# Patient Record
Sex: FEMALE | Race: WHITE | NOT HISPANIC OR LATINO | Employment: FULL TIME | URBAN - METROPOLITAN AREA
[De-identification: names, ages, dates, MRNs, and addresses within clinical notes are randomized per-mention and may not be internally consistent; named-entity substitution may affect disease eponyms.]

---

## 2017-04-18 RX ORDER — METOPROLOL SUCCINATE 25 MG
TABLET, EXTENDED RELEASE 24 HR ORAL
Qty: 45 TABLET | Refills: 0 | Status: SHIPPED | OUTPATIENT
Start: 2017-04-18 | End: 2017-07-12 | Stop reason: SDUPTHER

## 2017-07-12 RX ORDER — METOPROLOL SUCCINATE 25 MG
TABLET, EXTENDED RELEASE 24 HR ORAL
Qty: 45 TABLET | Refills: 0 | Status: SHIPPED | OUTPATIENT
Start: 2017-07-12 | End: 2017-10-03 | Stop reason: SDUPTHER

## 2017-07-27 ENCOUNTER — OFFICE VISIT (OUTPATIENT)
Dept: RETAIL CLINIC | Facility: CLINIC | Age: 57
End: 2017-07-27

## 2017-07-27 VITALS — OXYGEN SATURATION: 98 % | HEART RATE: 66 BPM | TEMPERATURE: 98.1 F

## 2017-07-27 DIAGNOSIS — J20.8 ACUTE BRONCHITIS DUE TO OTHER SPECIFIED ORGANISMS: Primary | ICD-10-CM

## 2017-07-27 PROBLEM — J20.9 ACUTE BRONCHITIS: Status: ACTIVE | Noted: 2017-07-27

## 2017-07-27 PROCEDURE — 99213 OFFICE O/P EST LOW 20 MIN: CPT | Performed by: NURSE PRACTITIONER

## 2017-07-27 RX ORDER — FLUTICASONE PROPIONATE 110 UG/1
1 AEROSOL, METERED RESPIRATORY (INHALATION)
Qty: 12 G | Refills: 0 | Status: SHIPPED | OUTPATIENT
Start: 2017-07-27 | End: 2017-12-05

## 2017-07-27 RX ORDER — AZITHROMYCIN 250 MG/1
TABLET, FILM COATED ORAL
Qty: 6 TABLET | Refills: 0 | Status: SHIPPED | OUTPATIENT
Start: 2017-07-27 | End: 2017-12-05

## 2017-07-27 NOTE — PROGRESS NOTES
Subjective   Patient ID: Shira Cortes is a 57 y.o. female presents with   Chief Complaint   Patient presents with   • URI       HPI Comments: 56 yo wf  PMH: HCM S/P valve repair '07  Cc: congestion and prod cough x 3d. Seh denies fever, chills, but does have some assoc GI upset. She has tried tylenol and OTC cough prep for relief.    URI    Associated symptoms include congestion, coughing, rhinorrhea and a sore throat. Pertinent negatives include no abdominal pain, chest pain, diarrhea, ear pain, rash, vomiting or wheezing.       Allergies   Allergen Reactions   • Penicillins    • Sulfa Antibiotics        The following portions of the patient's history were reviewed and updated as appropriate: allergies, current medications, past family history, past medical history, past social history, past surgical history and problem list.      Review of Systems   Constitutional: Positive for fatigue. Negative for activity change and unexpected weight change.   HENT: Positive for congestion, postnasal drip, rhinorrhea, sinus pressure and sore throat. Negative for ear pain.         Hoarseness   Eyes: Negative for pain and discharge.   Respiratory: Positive for cough. Negative for chest tightness, shortness of breath and wheezing.    Cardiovascular: Negative for chest pain and palpitations.   Gastrointestinal: Negative for abdominal pain, diarrhea and vomiting.   Endocrine: Negative.    Genitourinary: Negative.    Musculoskeletal: Negative for joint swelling.   Skin: Negative for color change, rash and wound.   Allergic/Immunologic: Negative.    Neurological: Negative for seizures and syncope.   Psychiatric/Behavioral: Negative.        Objective     Vitals:    07/27/17 1355   Pulse: 66   Temp: 98.1 °F (36.7 °C)   SpO2: 98%         Physical Exam   Constitutional: She is oriented to person, place, and time. She appears well-developed and well-nourished.  Non-toxic appearance. No distress.   HENT:   Head: Normocephalic and  atraumatic. Hair is normal.   Right Ear: Hearing, tympanic membrane, external ear and ear canal normal. No drainage, swelling or tenderness.   Left Ear: Hearing, tympanic membrane, external ear and ear canal normal. No drainage, swelling or tenderness.   Nose: Mucosal edema and rhinorrhea present. No epistaxis.   Mouth/Throat: Uvula is midline and mucous membranes are normal. No oral lesions. No uvula swelling. Posterior oropharyngeal erythema present. No oropharyngeal exudate. Tonsils are 1+ on the right. Tonsils are 1+ on the left. No tonsillar exudate.   Eyes: Conjunctivae, EOM and lids are normal. Pupils are equal, round, and reactive to light. Right eye exhibits no discharge. Left eye exhibits no discharge. No scleral icterus.   Neck: Normal range of motion. Neck supple.   Cardiovascular: Normal rate, regular rhythm and normal heart sounds.  Exam reveals no gallop.    No murmur heard.  Pulmonary/Chest: No stridor. No respiratory distress. She has wheezes. She has no rales. She exhibits no tenderness.   Abdominal: Soft. Bowel sounds are normal. There is no tenderness.   Lymphadenopathy:        Head (right side): No tonsillar adenopathy present.        Head (left side): No tonsillar adenopathy present.     She has no cervical adenopathy.   Neurological: She is alert and oriented to person, place, and time. She exhibits normal muscle tone.   Skin: Skin is warm and dry. No rash noted. She is not diaphoretic.   Psychiatric: She has a normal mood and affect. Her behavior is normal. Judgment and thought content normal.   Nursing note and vitals reviewed.        Shira was seen today for uri.    Diagnoses and all orders for this visit:    Acute bronchitis due to other specified organisms  -     azithromycin (ZITHROMAX Z-HINA) 250 MG tablet; Take 2 tablets the first day, then 1 tablet daily for 4 days.  -     fluticasone (FLOVENT HFA) 110 MCG/ACT inhaler; Inhale 1 puff 2 (Two) Times a Day.    Delsym cough suppressant  10cc po q12h prn cough OTC      Follow-up with Primary Care Physician in 24-48 hours if these symptoms worsen or fail to improve as anticipated.

## 2017-09-29 ENCOUNTER — TELEPHONE (OUTPATIENT)
Dept: CARDIOLOGY | Facility: CLINIC | Age: 57
End: 2017-09-29

## 2017-09-29 DIAGNOSIS — I42.2 HYPERTROPHIC CARDIOMYOPATHY (HCC): Primary | ICD-10-CM

## 2017-10-03 RX ORDER — METOPROLOL SUCCINATE 25 MG
TABLET, EXTENDED RELEASE 24 HR ORAL
Qty: 45 TABLET | Refills: 2 | Status: SHIPPED | OUTPATIENT
Start: 2017-10-03 | End: 2018-03-09

## 2017-12-05 ENCOUNTER — HOSPITAL ENCOUNTER (OUTPATIENT)
Dept: CARDIOLOGY | Facility: HOSPITAL | Age: 57
Discharge: HOME | End: 2017-12-05
Attending: INTERNAL MEDICINE | Admitting: INTERNAL MEDICINE

## 2017-12-05 ENCOUNTER — OFFICE VISIT (OUTPATIENT)
Dept: CARDIOLOGY | Facility: CLINIC | Age: 57
End: 2017-12-05

## 2017-12-05 VITALS
HEART RATE: 69 BPM | DIASTOLIC BLOOD PRESSURE: 60 MMHG | WEIGHT: 148 LBS | SYSTOLIC BLOOD PRESSURE: 112 MMHG | BODY MASS INDEX: 25.27 KG/M2 | HEIGHT: 64 IN

## 2017-12-05 VITALS
HEIGHT: 55 IN | BODY MASS INDEX: 33.79 KG/M2 | SYSTOLIC BLOOD PRESSURE: 122 MMHG | DIASTOLIC BLOOD PRESSURE: 84 MMHG | WEIGHT: 146 LBS | HEART RATE: 61 BPM

## 2017-12-05 DIAGNOSIS — I51.89 DIASTOLIC DYSFUNCTION: ICD-10-CM

## 2017-12-05 DIAGNOSIS — I42.2 CARDIOMYOPATHY, HYPERTROPHIC, PRIMARY FAMILIAL (HCC): Primary | ICD-10-CM

## 2017-12-05 DIAGNOSIS — I34.0 NON-RHEUMATIC MITRAL REGURGITATION: ICD-10-CM

## 2017-12-05 DIAGNOSIS — I42.2 HYPERTROPHIC CARDIOMYOPATHY (HCC): ICD-10-CM

## 2017-12-05 LAB
AV HCM GRAD VALS: 10 MMHG
BH CV ECHO MEAS - ACS: 2.1 CM
BH CV ECHO MEAS - AO MAX PG (FULL): -2.3 MMHG
BH CV ECHO MEAS - AO MAX PG: 12.3 MMHG
BH CV ECHO MEAS - AO MEAN PG (FULL): -1.5 MMHG
BH CV ECHO MEAS - AO MEAN PG: 6 MMHG
BH CV ECHO MEAS - AO ROOT AREA (BSA CORRECTED): 1.9
BH CV ECHO MEAS - AO ROOT AREA: 8 CM^2
BH CV ECHO MEAS - AO ROOT DIAM: 3.2 CM
BH CV ECHO MEAS - AO V2 MAX: 175.1 CM/SEC
BH CV ECHO MEAS - AO V2 MEAN: 113.2 CM/SEC
BH CV ECHO MEAS - AO V2 VTI: 34.7 CM
BH CV ECHO MEAS - AVA(I,A): 2.5 CM^2
BH CV ECHO MEAS - AVA(I,D): 2.5 CM^2
BH CV ECHO MEAS - AVA(V,A): 2.6 CM^2
BH CV ECHO MEAS - AVA(V,D): 2.6 CM^2
BH CV ECHO MEAS - BSA(HAYCOCK): 1.8 M^2
BH CV ECHO MEAS - BSA: 1.7 M^2
BH CV ECHO MEAS - BZI_BMI: 25.6 KILOGRAMS/M^2
BH CV ECHO MEAS - BZI_METRIC_HEIGHT: 162 CM
BH CV ECHO MEAS - BZI_METRIC_WEIGHT: 67.1 KG
BH CV ECHO MEAS - CONTRAST EF 4CH: 55.3 ML/M^2
BH CV ECHO MEAS - EDV(MOD-SP4): 85 ML
BH CV ECHO MEAS - EDV(TEICH): 135.4 ML
BH CV ECHO MEAS - EF(CUBED): 86.9 %
BH CV ECHO MEAS - EF(MOD-SP4): 55.3 %
BH CV ECHO MEAS - EF(TEICH): 80.2 %
BH CV ECHO MEAS - ESV(MOD-SP4): 38 ML
BH CV ECHO MEAS - ESV(TEICH): 26.7 ML
BH CV ECHO MEAS - FS: 49.3 %
BH CV ECHO MEAS - IVS/LVPW: 0.93
BH CV ECHO MEAS - IVSD: 1 CM
BH CV ECHO MEAS - LAT PEAK E' VEL: 6 CM/SEC
BH CV ECHO MEAS - LV DIASTOLIC VOL/BSA (35-75): 49.5 ML/M^2
BH CV ECHO MEAS - LV MASS(C)D: 210.4 GRAMS
BH CV ECHO MEAS - LV MASS(C)DI: 122.6 GRAMS/M^2
BH CV ECHO MEAS - LV MAX PG: 14.6 MMHG
BH CV ECHO MEAS - LV MEAN PG: 7.5 MMHG
BH CV ECHO MEAS - LV SYSTOLIC VOL/BSA (12-30): 22.1 ML/M^2
BH CV ECHO MEAS - LV V1 MAX: 191.1 CM/SEC
BH CV ECHO MEAS - LV V1 MEAN: 122.5 CM/SEC
BH CV ECHO MEAS - LV V1 VTI: 37.1 CM
BH CV ECHO MEAS - LVIDD: 5.3 CM
BH CV ECHO MEAS - LVIDS: 2.7 CM
BH CV ECHO MEAS - LVLD AP4: 7.8 CM
BH CV ECHO MEAS - LVLS AP4: 6.9 CM
BH CV ECHO MEAS - LVOT AREA (M): 2.3 CM^2
BH CV ECHO MEAS - LVOT AREA: 2.4 CM^2
BH CV ECHO MEAS - LVOT DIAM: 1.7 CM
BH CV ECHO MEAS - LVPWD: 1.1 CM
BH CV ECHO MEAS - MED PEAK E' VEL: 4 CM/SEC
BH CV ECHO MEAS - MR MAX PG: 95.5 MMHG
BH CV ECHO MEAS - MR MAX VEL: 488.7 CM/SEC
BH CV ECHO MEAS - MV A DUR: 0.11 SEC
BH CV ECHO MEAS - MV A MAX VEL: 90.2 CM/SEC
BH CV ECHO MEAS - MV DEC SLOPE: 397.8 CM/SEC^2
BH CV ECHO MEAS - MV DEC TIME: 0.18 SEC
BH CV ECHO MEAS - MV E MAX VEL: 105.3 CM/SEC
BH CV ECHO MEAS - MV E/A: 1.2
BH CV ECHO MEAS - MV MAX PG: 4.7 MMHG
BH CV ECHO MEAS - MV MEAN PG: 2.5 MMHG
BH CV ECHO MEAS - MV P1/2T MAX VEL: 97.3 CM/SEC
BH CV ECHO MEAS - MV P1/2T: 71.6 MSEC
BH CV ECHO MEAS - MV V2 MAX: 108.6 CM/SEC
BH CV ECHO MEAS - MV V2 MEAN: 76.4 CM/SEC
BH CV ECHO MEAS - MV V2 VTI: 34.4 CM
BH CV ECHO MEAS - MVA P1/2T LCG: 2.3 CM^2
BH CV ECHO MEAS - MVA(P1/2T): 3.1 CM^2
BH CV ECHO MEAS - MVA(VTI): 2.5 CM^2
BH CV ECHO MEAS - PA ACC TIME: 0.13 SEC
BH CV ECHO MEAS - PA MAX PG (FULL): 2.8 MMHG
BH CV ECHO MEAS - PA MAX PG: 6 MMHG
BH CV ECHO MEAS - PA PR(ACCEL): 18.8 MMHG
BH CV ECHO MEAS - PA V2 MAX: 122.2 CM/SEC
BH CV ECHO MEAS - PULM A REVS DUR: 0.16 SEC
BH CV ECHO MEAS - PULM A REVS VEL: 28.6 CM/SEC
BH CV ECHO MEAS - PULM DIAS VEL: 28.6 CM/SEC
BH CV ECHO MEAS - PULM S/D: 1.4
BH CV ECHO MEAS - PULM SYS VEL: 41.2 CM/SEC
BH CV ECHO MEAS - PVA(V,A): 2 CM^2
BH CV ECHO MEAS - PVA(V,D): 2 CM^2
BH CV ECHO MEAS - QP/QS: 0.63
BH CV ECHO MEAS - RAP SYSTOLE: 3 MMHG
BH CV ECHO MEAS - RV MAX PG: 3.2 MMHG
BH CV ECHO MEAS - RV MEAN PG: 2.2 MMHG
BH CV ECHO MEAS - RV V1 MAX: 88.8 CM/SEC
BH CV ECHO MEAS - RV V1 MEAN: 69.1 CM/SEC
BH CV ECHO MEAS - RV V1 VTI: 20.2 CM
BH CV ECHO MEAS - RVOT AREA: 2.7 CM^2
BH CV ECHO MEAS - RVOT DIAM: 1.9 CM
BH CV ECHO MEAS - RVSP: 12 MMHG
BH CV ECHO MEAS - SI(AO): 161.3 ML/M^2
BH CV ECHO MEAS - SI(CUBED): 75.4 ML/M^2
BH CV ECHO MEAS - SI(LVOT): 50.8 ML/M^2
BH CV ECHO MEAS - SI(MOD-SP4): 27.4 ML/M^2
BH CV ECHO MEAS - SI(TEICH): 63.3 ML/M^2
BH CV ECHO MEAS - SUP REN AO DIAM: 2 CM
BH CV ECHO MEAS - SV(AO): 276.9 ML
BH CV ECHO MEAS - SV(CUBED): 129.5 ML
BH CV ECHO MEAS - SV(LVOT): 87.2 ML
BH CV ECHO MEAS - SV(MOD-SP4): 47 ML
BH CV ECHO MEAS - SV(RVOT): 54.9 ML
BH CV ECHO MEAS - SV(TEICH): 108.6 ML
BH CV ECHO MEAS - TAPSE (>1.6): 1.4 CM2
BH CV ECHO MEAS - TR MAX VEL: 149.3 CM/SEC
BH CV XLRA - RV BASE: 2.5 CM
BH CV XLRA - TDI S': 9 CM/SEC
E/E' RATIO: 22
LEFT ATRIUM VOLUME INDEX: 3 ML/M2
LV EF 2D ECHO EST: 55 %
SINUS: 2.9 CM
STJ: 3.1 CM

## 2017-12-05 PROCEDURE — 93306 TTE W/DOPPLER COMPLETE: CPT | Performed by: INTERNAL MEDICINE

## 2017-12-05 PROCEDURE — 0399T ADULT TRANSTHORACIC ECHO COMPLETE W/ CONT IF NECESSARY PER PROTOCOL: CPT | Performed by: INTERNAL MEDICINE

## 2017-12-05 PROCEDURE — 93306 TTE W/DOPPLER COMPLETE: CPT

## 2017-12-05 PROCEDURE — 99214 OFFICE O/P EST MOD 30 MIN: CPT | Performed by: INTERNAL MEDICINE

## 2017-12-05 PROCEDURE — 93000 ELECTROCARDIOGRAM COMPLETE: CPT | Performed by: INTERNAL MEDICINE

## 2017-12-05 PROCEDURE — 0399T HC MYOCARDL STRAIN IMAG QUAN ASSMT PER SESS: CPT

## 2017-12-05 NOTE — PROGRESS NOTES
Date of Office Visit: 2017  Encounter Provider: Jacqui Palencia MD  Place of Service: [facility] CARDIOLOGY  Patient Name: Shira Cortes  :1960      Patient ID:  Shira Cortes is a 57 y.o. female is here for  followup for hypertrophic cardiomyopathy        History of Present Illness    She was first evaluated on 2010 secondary to complaints of dizziness, and near  syncope with exertion. Her echocardiogram showed marked septal hypertrophy with systolic  anterior movement of the anterior mitral leaflet and severe left ventricular outflow tract  obstruction with a peak gradient of 72 mmHg. There was grade II diastolic dysfunction and  a hyperdynamic small size left ventricle. There was also mild mitral insufficiency. She  had a cardiac catheterization performed on 2010 which showed normal coronaries and  hypertrophic myopathy. She then had a Holter monitor recording performed which showed  premature ventricular complexes. She also had a stress echocardiogram performed which  showed hypotension with exercise and severe systolic anterior movement of the mitral  leaflet at rest with exercise and with exercise. The left ventricular outflow tract  gradient increased with exercise as well as the number of premature ventricular complexes.  She also had nonsustained ventricular tachycardia with exercise.      She was referred to [facility] for a septal myectomy which she has had performed on  2010. She had septal myectomy with plication of A2 of mitral valve leaflet and  resection of the accessory papillary muscle head to the A3 segment of the mitral valve  performed by Dr. Anselmo Diop at the [facility]. She had a cardiac MRI, which  confirmed hypertrophic cardiomyopathy. Her postoperative echocardiogram showed an  ejection fraction of 65%, no systolic anterior movement of the mitral leaflet and 1+  mitral insufficiency. Her resting left  ventricular outflow tract gradient was 26 mmHg and  there was no increase in her mitral insufficiency and left ventricular outflow tract  gradient after amyl nitrite.      She did have a 2-D  echocardiogram with Doppler done 12/05/2012 showing ejection fraction of 63%, grade II  diastolic dysfunction, mild concentric left ventricular hypertrophy, mild-to-moderate  mitral insufficiency.        She is doing Jazzercise 4 days a week and feels well.  She's had no tachycardia that sustains.  She's had no dizziness or syncope.  She has no exertional chest tightness or pressure and no difficulty breathing.    She had an echocardiogram done 12/5/17 showing ejection fraction of 55% with mild mitral insufficiency status post surgical mitral valve repair with restricted movement of the posterior leaflet.  There was noted systolic anterior motion of the coral apparatus but no significant LVOT obstruction is seen.  The LVOT gradient with Valsalva was 11 mmHg and 9 mmHg with rest.        Past Medical History:   Diagnosis Date   • Diastolic dysfunction    • Hypertrophic cardiomyopathy    • Mitral valve insufficiency    • PVC (premature ventricular contraction)    • Ventricular tachyarrhythmia          Past Surgical History:   Procedure Laterality Date   • HYSTERECTOMY     • INFERIOR OBLIQUE MYECTOMY     • MITRAL VALVE REPAIR/REPLACEMENT         Current Outpatient Prescriptions on File Prior to Visit   Medication Sig Dispense Refill   • aspirin 81 MG tablet Take by mouth daily.     • TOPROL XL 25 MG 24 hr tablet TAKE 1/2 TABLET BY MOUTH DAILY 45 tablet 2   • [DISCONTINUED] azithromycin (ZITHROMAX Z-HINA) 250 MG tablet Take 2 tablets the first day, then 1 tablet daily for 4 days. 6 tablet 0   • [DISCONTINUED] fluticasone (FLOVENT HFA) 110 MCG/ACT inhaler Inhale 1 puff 2 (Two) Times a Day. 12 g 0     No current facility-administered medications on file prior to visit.        Social History     Social History   • Marital status:  "     Spouse name: N/A   • Number of children: N/A   • Years of education: N/A     Occupational History   • Not on file.     Social History Main Topics   • Smoking status: Current Some Day Smoker   • Smokeless tobacco: Not on file   • Alcohol use Yes      Comment: RARE   • Drug use: Not on file      Comment: CAFFEINE USE    • Sexual activity: Not on file     Other Topics Concern   • Not on file     Social History Narrative           Review of Systems   Constitution: Negative.   HENT: Negative for congestion.    Eyes: Negative for vision loss in left eye and vision loss in right eye.   Respiratory: Negative.  Negative for cough, hemoptysis, shortness of breath, sleep disturbances due to breathing, snoring, sputum production and wheezing.    Endocrine: Negative.    Hematologic/Lymphatic: Negative.    Skin: Negative for poor wound healing and rash.   Musculoskeletal: Negative for falls, gout, muscle cramps and myalgias.   Gastrointestinal: Negative for abdominal pain, diarrhea, dysphagia, hematemesis, melena, nausea and vomiting.   Neurological: Negative for excessive daytime sleepiness, dizziness, headaches, light-headedness, loss of balance, seizures and vertigo.   Psychiatric/Behavioral: Negative for depression and substance abuse. The patient is not nervous/anxious.        Procedures    ECG 12 Lead  Date/Time: 12/5/2017 12:58 PM  Performed by: MP FARRIS  Authorized by: MP FARRIS   Comparison: compared with previous ECG   Similar to previous ECG  Rhythm: sinus rhythm  Conduction: left bundle branch block  Clinical impression: abnormal ECG               Objective:      Vitals:    12/05/17 1243   BP: 122/84   Pulse: 61   Weight: 66.2 kg (146 lb)   Height: 64 cm (25.2\")     Body mass index is 161.68 kg/(m^2).    Physical Exam   Constitutional: She is oriented to person, place, and time. She appears well-developed and well-nourished. No distress.   HENT:   Head: Normocephalic and atraumatic. "   Eyes: Conjunctivae are normal. No scleral icterus.   Neck: Neck supple. No JVD present. Carotid bruit is not present. No thyromegaly present.   Cardiovascular: Normal rate, regular rhythm, S1 normal, S2 normal and intact distal pulses.   No extrasystoles are present. PMI is not displaced.  Exam reveals no gallop.    Murmur heard.   Midsystolic murmur is present with a grade of 3/6  at the upper right sternal border, upper left sternal border  Pulses:       Carotid pulses are 2+ on the right side, and 2+ on the left side.       Radial pulses are 2+ on the right side, and 2+ on the left side.        Dorsalis pedis pulses are 2+ on the right side, and 2+ on the left side.        Posterior tibial pulses are 2+ on the right side, and 2+ on the left side.   Pulmonary/Chest: Effort normal and breath sounds normal. No respiratory distress. She has no wheezes. She has no rhonchi. She has no rales. She exhibits no tenderness.   Abdominal: Soft. Bowel sounds are normal. She exhibits no distension, no abdominal bruit and no mass. There is no tenderness.   Musculoskeletal: She exhibits no edema or deformity.   Lymphadenopathy:     She has no cervical adenopathy.   Neurological: She is alert and oriented to person, place, and time. No cranial nerve deficit.   Skin: Skin is warm and dry. No rash noted. She is not diaphoretic. No cyanosis. No pallor. Nails show no clubbing.   Psychiatric: She has a normal mood and affect. Judgment normal.   Vitals reviewed.      Lab Review:       Assessment:      Diagnosis Plan   1. Cardiomyopathy, hypertrophic, primary familial     2. Diastolic dysfunction     3. Non-rheumatic mitral regurgitation       1. Hypertrophic cardiomyopathy, s/p Septal myectomy with plication of A2 of the mitral leaflet and resection of the accessory papillary muscle head to A3 of the mitral valve done 12/29/2010 at [facility].  Has no LVOT obstruction.   2. Mild mitral insufficiency, stable.      Plan:       See  back in 1 year, no changes.

## 2017-12-18 ENCOUNTER — APPOINTMENT (OUTPATIENT)
Dept: WOMENS IMAGING | Facility: HOSPITAL | Age: 57
End: 2017-12-18

## 2017-12-18 PROCEDURE — 77067 SCR MAMMO BI INCL CAD: CPT | Performed by: RADIOLOGY

## 2017-12-27 ENCOUNTER — TELEPHONE (OUTPATIENT)
Dept: CARDIOLOGY | Facility: CLINIC | Age: 57
End: 2017-12-27

## 2018-01-11 ENCOUNTER — TELEPHONE (OUTPATIENT)
Dept: CARDIOLOGY | Facility: CLINIC | Age: 58
End: 2018-01-11

## 2018-01-11 NOTE — TELEPHONE ENCOUNTER
Pt called stating that her PCP would like to start her on Wellbutrin XL to help with smoking cessation but she wanted to check with you in regards to starting this before she did     Is this ok for her to take?

## 2018-03-09 ENCOUNTER — APPOINTMENT (OUTPATIENT)
Dept: PREADMISSION TESTING | Facility: HOSPITAL | Age: 58
End: 2018-03-09

## 2018-03-09 VITALS
BODY MASS INDEX: 25.1 KG/M2 | RESPIRATION RATE: 16 BRPM | SYSTOLIC BLOOD PRESSURE: 104 MMHG | TEMPERATURE: 97 F | OXYGEN SATURATION: 97 % | HEART RATE: 58 BPM | DIASTOLIC BLOOD PRESSURE: 68 MMHG | HEIGHT: 64 IN | WEIGHT: 147 LBS

## 2018-03-09 LAB
ANION GAP SERPL CALCULATED.3IONS-SCNC: 10.2 MMOL/L
BASOPHILS # BLD AUTO: 0.02 10*3/MM3 (ref 0–0.2)
BASOPHILS NFR BLD AUTO: 0.3 % (ref 0–1.5)
BUN BLD-MCNC: 14 MG/DL (ref 6–20)
BUN/CREAT SERPL: 19.4 (ref 7–25)
CALCIUM SPEC-SCNC: 9.5 MG/DL (ref 8.6–10.5)
CHLORIDE SERPL-SCNC: 103 MMOL/L (ref 98–107)
CO2 SERPL-SCNC: 26.8 MMOL/L (ref 22–29)
CREAT BLD-MCNC: 0.72 MG/DL (ref 0.57–1)
DEPRECATED RDW RBC AUTO: 47 FL (ref 37–54)
EOSINOPHIL # BLD AUTO: 0.15 10*3/MM3 (ref 0–0.7)
EOSINOPHIL NFR BLD AUTO: 2.2 % (ref 0.3–6.2)
ERYTHROCYTE [DISTWIDTH] IN BLOOD BY AUTOMATED COUNT: 13.5 % (ref 11.7–13)
GFR SERPL CREATININE-BSD FRML MDRD: 83 ML/MIN/1.73
GLUCOSE BLD-MCNC: 90 MG/DL (ref 65–99)
HCT VFR BLD AUTO: 41.9 % (ref 35.6–45.5)
HGB BLD-MCNC: 13.6 G/DL (ref 11.9–15.5)
IMM GRANULOCYTES # BLD: 0 10*3/MM3 (ref 0–0.03)
IMM GRANULOCYTES NFR BLD: 0 % (ref 0–0.5)
LYMPHOCYTES # BLD AUTO: 2.19 10*3/MM3 (ref 0.9–4.8)
LYMPHOCYTES NFR BLD AUTO: 31.6 % (ref 19.6–45.3)
MCH RBC QN AUTO: 31.2 PG (ref 26.9–32)
MCHC RBC AUTO-ENTMCNC: 32.5 G/DL (ref 32.4–36.3)
MCV RBC AUTO: 96.1 FL (ref 80.5–98.2)
MONOCYTES # BLD AUTO: 0.57 10*3/MM3 (ref 0.2–1.2)
MONOCYTES NFR BLD AUTO: 8.2 % (ref 5–12)
NEUTROPHILS # BLD AUTO: 4.01 10*3/MM3 (ref 1.9–8.1)
NEUTROPHILS NFR BLD AUTO: 57.7 % (ref 42.7–76)
PLATELET # BLD AUTO: 259 10*3/MM3 (ref 140–500)
PMV BLD AUTO: 10.9 FL (ref 6–12)
POTASSIUM BLD-SCNC: 4.2 MMOL/L (ref 3.5–5.2)
RBC # BLD AUTO: 4.36 10*6/MM3 (ref 3.9–5.2)
SODIUM BLD-SCNC: 140 MMOL/L (ref 136–145)
WBC NRBC COR # BLD: 6.94 10*3/MM3 (ref 4.5–10.7)

## 2018-03-09 PROCEDURE — 80048 BASIC METABOLIC PNL TOTAL CA: CPT | Performed by: OBSTETRICS & GYNECOLOGY

## 2018-03-09 PROCEDURE — 85025 COMPLETE CBC W/AUTO DIFF WBC: CPT | Performed by: OBSTETRICS & GYNECOLOGY

## 2018-03-09 RX ORDER — BUPROPION HYDROCHLORIDE 100 MG/1
100 TABLET, EXTENDED RELEASE ORAL DAILY
COMMUNITY
End: 2020-04-06

## 2018-03-09 RX ORDER — METOPROLOL SUCCINATE 25 MG/1
12.5 TABLET, EXTENDED RELEASE ORAL DAILY
COMMUNITY
End: 2018-12-14 | Stop reason: SDUPTHER

## 2018-03-09 NOTE — DISCHARGE INSTRUCTIONS
PLEASE ARRIVE AT 11:30 AM ON 3/12/2018        Take the following medications the morning of surgery with a small sip of water:  TOPROL-XL        General Instructions:  • Do not eat solid food after midnight the night before surgery.  • You may drink clear liquids day of surgery but must stop at least one hour before your hospital arrival time.  • It is beneficial for you to have a clear drink that contains carbohydrates the day of surgery.  We suggest a 12 to 20 ounce bottle of Gatorade or Powerade for non-diabetic patients or a 12 to 20 ounce bottle of G2 or Powerade Zero for diabetic patients. (Pediatric patients, are not advised to drink a 12 to 20 ounce carbohydrate drink)    Clear liquids are liquids you can see through.  Nothing red in color.     Plain water                               Sports drinks  Sodas                                   Gelatin (Jell-O)  Fruit juices without pulp such as white grape juice and apple juice  Popsicles that contain no fruit or yogurt  Tea or coffee (no cream or milk added)  Gatorade / Powerade  G2 / Powerade Zero    • Infants may have breast milk up to four hours before surgery.  • Infants drinking formula may drink formula up to six hours before surgery.   • Patients who avoid smoking, chewing tobacco and alcohol for 4 weeks prior to surgery have a reduced risk of post-operative complications.  Quit smoking as many days before surgery as you can.  • Do not smoke, use chewing tobacco or drink alcohol the day of surgery.   • If applicable bring your C-PAP/ BI-PAP machine.  • Bring any papers given to you in the doctor’s office.  • Wear clean comfortable clothes and socks.  • Do not wear contact lenses or make-up.  Bring a case for your glasses.   • Bring crutches or walker if applicable.  • Remove all piercings.  Leave jewelry and any other valuables at home.  • Hair extensions with metal clips must be removed prior to surgery.  • The Pre-Admission Testing nurse will instruct  you to bring medications if unable to obtain an accurate list in Pre-Admission Testing.            Preventing a Surgical Site Infection:  • For 2 to 3 days before surgery, avoid shaving with a razor because the razor can irritate skin and make it easier to develop an infection.  • The night prior to surgery sleep in a clean bed with clean clothing.  Do not allow pets to sleep with you.  • Shower on the morning of surgery using a fresh bar of anti-bacterial soap (such as Dial) and clean washcloth.  Dry with a clean towel and dress in clean clothing.  • Ask your surgeon if you will be receiving antibiotics prior to surgery.  • Make sure you, your family, and all healthcare providers clean their hands with soap and water or an alcohol based hand  before caring for you or your wound.    Day of surgery:  Upon arrival, a Pre-op nurse and Anesthesiologist will review your health history, obtain vital signs, and answer questions you may have.  The only belongings needed at this time will be your home medications and if applicable your C-PAP/BI-PAP machine.  If you are staying overnight your family can leave the rest of your belongings in the car and bring them to your room later.  A Pre-op nurse will start an IV and you may receive medication in preparation for surgery, including something to help you relax.  Your family will be able to see you in the Pre-op area.  While you are in surgery your family should notify the waiting room  if they leave the waiting room area and provide a contact phone number.    Please be aware that surgery does come with discomfort.  We want to make every effort to control your discomfort so please discuss any uncontrolled symptoms with your nurse.   Your doctor will most likely have prescribed pain medications.      If you are going home after surgery you will receive individualized written care instructions before being discharged.  A responsible adult must drive you to  and from the hospital on the day of your surgery and stay with you for 24 hours.    If you are staying overnight following surgery, you will be transported to your hospital room following the recovery period.  [facility] has all private rooms.    If you have any questions please call Pre-Admission Testing at [phone].  Deductibles and co-payments are collected on the day of service. Please be prepared to pay the required co-pay, deductible or deposit on the day of service as defined by your plan.

## 2018-03-12 ENCOUNTER — HOSPITAL ENCOUNTER (OUTPATIENT)
Facility: HOSPITAL | Age: 58
Setting detail: HOSPITAL OUTPATIENT SURGERY
Discharge: HOME | End: 2018-03-12
Attending: OBSTETRICS & GYNECOLOGY | Admitting: OBSTETRICS & GYNECOLOGY

## 2018-03-12 ENCOUNTER — ANESTHESIA (OUTPATIENT)
Dept: PERIOP | Facility: HOSPITAL | Age: 58
End: 2018-03-12

## 2018-03-12 ENCOUNTER — ANESTHESIA EVENT (OUTPATIENT)
Dept: PERIOP | Facility: HOSPITAL | Age: 58
End: 2018-03-12

## 2018-03-12 VITALS
OXYGEN SATURATION: 97 % | SYSTOLIC BLOOD PRESSURE: 121 MMHG | DIASTOLIC BLOOD PRESSURE: 75 MMHG | HEART RATE: 56 BPM | WEIGHT: 145.3 LBS | BODY MASS INDEX: 24.94 KG/M2 | RESPIRATION RATE: 16 BRPM | TEMPERATURE: 98.1 F

## 2018-03-12 DIAGNOSIS — N83.8 OVARIAN MASS, LEFT: ICD-10-CM

## 2018-03-12 DIAGNOSIS — N83.8 OVARIAN MASS, RIGHT: ICD-10-CM

## 2018-03-12 PROCEDURE — 25010000002 MIDAZOLAM PER 1 MG: Performed by: ANESTHESIOLOGY

## 2018-03-12 PROCEDURE — 88112 CYTOPATH CELL ENHANCE TECH: CPT | Performed by: OBSTETRICS & GYNECOLOGY

## 2018-03-12 PROCEDURE — 25010000002 KETOROLAC TROMETHAMINE PER 15 MG: Performed by: NURSE ANESTHETIST, CERTIFIED REGISTERED

## 2018-03-12 PROCEDURE — 25010000002 FENTANYL CITRATE (PF) 100 MCG/2ML SOLUTION: Performed by: NURSE ANESTHETIST, CERTIFIED REGISTERED

## 2018-03-12 PROCEDURE — 25010000002 PROPOFOL 10 MG/ML EMULSION: Performed by: NURSE ANESTHETIST, CERTIFIED REGISTERED

## 2018-03-12 PROCEDURE — 25010000002 DEXAMETHASONE PER 1 MG: Performed by: NURSE ANESTHETIST, CERTIFIED REGISTERED

## 2018-03-12 PROCEDURE — 88307 TISSUE EXAM BY PATHOLOGIST: CPT | Performed by: OBSTETRICS & GYNECOLOGY

## 2018-03-12 RX ORDER — WOUND DRESSING ADHESIVE - LIQUID
LIQUID MISCELLANEOUS AS NEEDED
Status: DISCONTINUED | OUTPATIENT
Start: 2018-03-12 | End: 2018-03-12 | Stop reason: HOSPADM

## 2018-03-12 RX ORDER — CLINDAMYCIN PHOSPHATE 600 MG/50ML
INJECTION INTRAVENOUS
Status: COMPLETED
Start: 2018-03-12 | End: 2018-03-12

## 2018-03-12 RX ORDER — MAGNESIUM HYDROXIDE 1200 MG/15ML
LIQUID ORAL AS NEEDED
Status: DISCONTINUED | OUTPATIENT
Start: 2018-03-12 | End: 2018-03-12 | Stop reason: HOSPADM

## 2018-03-12 RX ORDER — MIDAZOLAM HYDROCHLORIDE 1 MG/ML
1 INJECTION INTRAMUSCULAR; INTRAVENOUS
Status: DISCONTINUED | OUTPATIENT
Start: 2018-03-12 | End: 2018-03-12 | Stop reason: HOSPADM

## 2018-03-12 RX ORDER — HYDROCODONE BITARTRATE AND ACETAMINOPHEN 5; 325 MG/1; MG/1
1-2 TABLET ORAL EVERY 4 HOURS PRN
Qty: 25 TABLET | Refills: 0 | Status: SHIPPED | OUTPATIENT
Start: 2018-03-12 | End: 2020-04-06

## 2018-03-12 RX ORDER — SODIUM CHLORIDE 0.9 % (FLUSH) 0.9 %
1-10 SYRINGE (ML) INJECTION AS NEEDED
Status: DISCONTINUED | OUTPATIENT
Start: 2018-03-12 | End: 2018-03-12 | Stop reason: HOSPADM

## 2018-03-12 RX ORDER — PROMETHAZINE HYDROCHLORIDE 25 MG/ML
12.5 INJECTION, SOLUTION INTRAMUSCULAR; INTRAVENOUS ONCE AS NEEDED
Status: DISCONTINUED | OUTPATIENT
Start: 2018-03-12 | End: 2018-03-13 | Stop reason: HOSPADM

## 2018-03-12 RX ORDER — EPHEDRINE SULFATE 50 MG/ML
5 INJECTION, SOLUTION INTRAVENOUS ONCE AS NEEDED
Status: DISCONTINUED | OUTPATIENT
Start: 2018-03-12 | End: 2018-03-13 | Stop reason: HOSPADM

## 2018-03-12 RX ORDER — OXYCODONE AND ACETAMINOPHEN 7.5; 325 MG/1; MG/1
1 TABLET ORAL ONCE AS NEEDED
Status: DISCONTINUED | OUTPATIENT
Start: 2018-03-12 | End: 2018-03-13 | Stop reason: HOSPADM

## 2018-03-12 RX ORDER — HYDRALAZINE HYDROCHLORIDE 20 MG/ML
5 INJECTION INTRAMUSCULAR; INTRAVENOUS
Status: DISCONTINUED | OUTPATIENT
Start: 2018-03-12 | End: 2018-03-13 | Stop reason: HOSPADM

## 2018-03-12 RX ORDER — FENTANYL CITRATE 50 UG/ML
50 INJECTION, SOLUTION INTRAMUSCULAR; INTRAVENOUS
Status: DISCONTINUED | OUTPATIENT
Start: 2018-03-12 | End: 2018-03-13 | Stop reason: HOSPADM

## 2018-03-12 RX ORDER — MIDAZOLAM HYDROCHLORIDE 1 MG/ML
2 INJECTION INTRAMUSCULAR; INTRAVENOUS
Status: DISCONTINUED | OUTPATIENT
Start: 2018-03-12 | End: 2018-03-12 | Stop reason: HOSPADM

## 2018-03-12 RX ORDER — HYDROCODONE BITARTRATE AND ACETAMINOPHEN 5; 325 MG/1; MG/1
2 TABLET ORAL EVERY 6 HOURS PRN
Status: DISCONTINUED | OUTPATIENT
Start: 2018-03-12 | End: 2018-03-13 | Stop reason: HOSPADM

## 2018-03-12 RX ORDER — LIDOCAINE HYDROCHLORIDE 20 MG/ML
INJECTION, SOLUTION INFILTRATION; PERINEURAL AS NEEDED
Status: DISCONTINUED | OUTPATIENT
Start: 2018-03-12 | End: 2018-03-12 | Stop reason: SURG

## 2018-03-12 RX ORDER — PROPOFOL 10 MG/ML
VIAL (ML) INTRAVENOUS AS NEEDED
Status: DISCONTINUED | OUTPATIENT
Start: 2018-03-12 | End: 2018-03-12 | Stop reason: SURG

## 2018-03-12 RX ORDER — NALOXONE HCL 0.4 MG/ML
0.2 VIAL (ML) INJECTION AS NEEDED
Status: DISCONTINUED | OUTPATIENT
Start: 2018-03-12 | End: 2018-03-13 | Stop reason: HOSPADM

## 2018-03-12 RX ORDER — PROMETHAZINE HYDROCHLORIDE 25 MG/1
25 TABLET ORAL ONCE AS NEEDED
Status: DISCONTINUED | OUTPATIENT
Start: 2018-03-12 | End: 2018-03-13 | Stop reason: HOSPADM

## 2018-03-12 RX ORDER — ROCURONIUM BROMIDE 10 MG/ML
INJECTION, SOLUTION INTRAVENOUS AS NEEDED
Status: DISCONTINUED | OUTPATIENT
Start: 2018-03-12 | End: 2018-03-12 | Stop reason: SURG

## 2018-03-12 RX ORDER — FENTANYL CITRATE 50 UG/ML
INJECTION, SOLUTION INTRAMUSCULAR; INTRAVENOUS AS NEEDED
Status: DISCONTINUED | OUTPATIENT
Start: 2018-03-12 | End: 2018-03-12 | Stop reason: SURG

## 2018-03-12 RX ORDER — KETOROLAC TROMETHAMINE 30 MG/ML
INJECTION, SOLUTION INTRAMUSCULAR; INTRAVENOUS AS NEEDED
Status: DISCONTINUED | OUTPATIENT
Start: 2018-03-12 | End: 2018-03-12 | Stop reason: SURG

## 2018-03-12 RX ORDER — PROMETHAZINE HYDROCHLORIDE 25 MG/1
12.5 TABLET ORAL ONCE AS NEEDED
Status: DISCONTINUED | OUTPATIENT
Start: 2018-03-12 | End: 2018-03-13 | Stop reason: HOSPADM

## 2018-03-12 RX ORDER — PROMETHAZINE HYDROCHLORIDE 25 MG/1
25 SUPPOSITORY RECTAL ONCE AS NEEDED
Status: DISCONTINUED | OUTPATIENT
Start: 2018-03-12 | End: 2018-03-13 | Stop reason: HOSPADM

## 2018-03-12 RX ORDER — ONDANSETRON 2 MG/ML
4 INJECTION INTRAMUSCULAR; INTRAVENOUS ONCE AS NEEDED
Status: DISCONTINUED | OUTPATIENT
Start: 2018-03-12 | End: 2018-03-13 | Stop reason: HOSPADM

## 2018-03-12 RX ORDER — DIPHENHYDRAMINE HYDROCHLORIDE 50 MG/ML
12.5 INJECTION INTRAMUSCULAR; INTRAVENOUS
Status: DISCONTINUED | OUTPATIENT
Start: 2018-03-12 | End: 2018-03-13 | Stop reason: HOSPADM

## 2018-03-12 RX ORDER — CLINDAMYCIN PHOSPHATE 600 MG/50ML
600 INJECTION INTRAVENOUS EVERY 8 HOURS
Status: COMPLETED | OUTPATIENT
Start: 2018-03-12 | End: 2018-03-12

## 2018-03-12 RX ORDER — FAMOTIDINE 10 MG/ML
20 INJECTION, SOLUTION INTRAVENOUS ONCE
Status: COMPLETED | OUTPATIENT
Start: 2018-03-12 | End: 2018-03-12

## 2018-03-12 RX ORDER — FLUMAZENIL 0.1 MG/ML
0.2 INJECTION INTRAVENOUS AS NEEDED
Status: DISCONTINUED | OUTPATIENT
Start: 2018-03-12 | End: 2018-03-13 | Stop reason: HOSPADM

## 2018-03-12 RX ORDER — LABETALOL HYDROCHLORIDE 5 MG/ML
5 INJECTION, SOLUTION INTRAVENOUS
Status: DISCONTINUED | OUTPATIENT
Start: 2018-03-12 | End: 2018-03-13 | Stop reason: HOSPADM

## 2018-03-12 RX ORDER — LIDOCAINE HYDROCHLORIDE 10 MG/ML
0.5 INJECTION, SOLUTION EPIDURAL; INFILTRATION; INTRACAUDAL; PERINEURAL ONCE AS NEEDED
Status: COMPLETED | OUTPATIENT
Start: 2018-03-12 | End: 2018-03-12

## 2018-03-12 RX ORDER — FENTANYL CITRATE 50 UG/ML
50 INJECTION, SOLUTION INTRAMUSCULAR; INTRAVENOUS
Status: DISCONTINUED | OUTPATIENT
Start: 2018-03-12 | End: 2018-03-12 | Stop reason: HOSPADM

## 2018-03-12 RX ORDER — DEXAMETHASONE SODIUM PHOSPHATE 10 MG/ML
INJECTION INTRAMUSCULAR; INTRAVENOUS AS NEEDED
Status: DISCONTINUED | OUTPATIENT
Start: 2018-03-12 | End: 2018-03-12 | Stop reason: SURG

## 2018-03-12 RX ORDER — HYDROCODONE BITARTRATE AND ACETAMINOPHEN 7.5; 325 MG/1; MG/1
1 TABLET ORAL ONCE AS NEEDED
Status: COMPLETED | OUTPATIENT
Start: 2018-03-12 | End: 2018-03-12

## 2018-03-12 RX ORDER — SODIUM CHLORIDE, SODIUM LACTATE, POTASSIUM CHLORIDE, CALCIUM CHLORIDE 600; 310; 30; 20 MG/100ML; MG/100ML; MG/100ML; MG/100ML
9 INJECTION, SOLUTION INTRAVENOUS CONTINUOUS
Status: DISCONTINUED | OUTPATIENT
Start: 2018-03-12 | End: 2018-03-13 | Stop reason: HOSPADM

## 2018-03-12 RX ADMIN — MIDAZOLAM 2 MG: 1 INJECTION INTRAMUSCULAR; INTRAVENOUS at 13:16

## 2018-03-12 RX ADMIN — FAMOTIDINE 20 MG: 10 INJECTION INTRAVENOUS at 12:10

## 2018-03-12 RX ADMIN — DEXAMETHASONE SODIUM PHOSPHATE 8 MG: 10 INJECTION INTRAMUSCULAR; INTRAVENOUS at 14:01

## 2018-03-12 RX ADMIN — MIDAZOLAM 2 MG: 1 INJECTION INTRAMUSCULAR; INTRAVENOUS at 12:09

## 2018-03-12 RX ADMIN — PROPOFOL 200 MG: 10 INJECTION, EMULSION INTRAVENOUS at 13:52

## 2018-03-12 RX ADMIN — KETOROLAC TROMETHAMINE 30 MG: 30 INJECTION, SOLUTION INTRAMUSCULAR; INTRAVENOUS at 14:45

## 2018-03-12 RX ADMIN — SUGAMMADEX 140 MG: 100 INJECTION, SOLUTION INTRAVENOUS at 14:45

## 2018-03-12 RX ADMIN — LIDOCAINE HYDROCHLORIDE 60 MG: 20 INJECTION, SOLUTION INFILTRATION; PERINEURAL at 13:52

## 2018-03-12 RX ADMIN — SODIUM CHLORIDE, POTASSIUM CHLORIDE, SODIUM LACTATE AND CALCIUM CHLORIDE: 600; 310; 30; 20 INJECTION, SOLUTION INTRAVENOUS at 14:40

## 2018-03-12 RX ADMIN — FENTANYL CITRATE 50 MCG: 50 INJECTION, SOLUTION INTRAMUSCULAR; INTRAVENOUS at 15:35

## 2018-03-12 RX ADMIN — FENTANYL CITRATE 50 MCG: 50 INJECTION, SOLUTION INTRAMUSCULAR; INTRAVENOUS at 15:47

## 2018-03-12 RX ADMIN — FENTANYL CITRATE 100 MCG: 50 INJECTION INTRAMUSCULAR; INTRAVENOUS at 13:52

## 2018-03-12 RX ADMIN — ROCURONIUM BROMIDE 40 MG: 10 INJECTION INTRAVENOUS at 13:52

## 2018-03-12 RX ADMIN — LIDOCAINE HYDROCHLORIDE 0.5 ML: 10 INJECTION, SOLUTION EPIDURAL; INFILTRATION; INTRACAUDAL; PERINEURAL at 12:09

## 2018-03-12 RX ADMIN — SODIUM CHLORIDE, POTASSIUM CHLORIDE, SODIUM LACTATE AND CALCIUM CHLORIDE 9 ML/HR: 600; 310; 30; 20 INJECTION, SOLUTION INTRAVENOUS at 12:09

## 2018-03-12 RX ADMIN — CLINDAMYCIN PHOSPHATE 600 MG: 12 INJECTION, SOLUTION INTRAVENOUS at 13:56

## 2018-03-12 RX ADMIN — HYDROCODONE BITARTRATE AND ACETAMINOPHEN 1 TABLET: 7.5; 325 TABLET ORAL at 15:27

## 2018-03-12 NOTE — DISCHARGE INSTRUCTIONS
Acetaminophen; Hydrocodone tablets or capsules  Last dose given at 3:27 pm for pain in recovery  What is this medicine?  ACETAMINOPHEN; HYDROCODONE (a set a RAVI shelby fen; kinsey droe KOE done) is a pain reliever. It is used to treat moderate to severe pain.  This medicine may be used for other purposes; ask your health care provider or pharmacist if you have questions.  COMMON BRAND NAME(S): Anexsia, Bancap HC, Ceta-Plus, Co-Gesic, Comfortpak, Dolagesic, Dolorex Forte, DuoCet, Hydrocet, Hydrogesic, Lorcet, Lorcet HD, Lorcet Plus, Lortab, Margesic H, Maxidone, Norco, Polygesic, Stagesic, Vanacet, Verdrocet, Vicodin, Vicodin ES, Vicodin HP, Xodol, Zydone  What should I tell my health care provider before I take this medicine?  They need to know if you have any of these conditions:  -brain tumor  -Crohn's disease, inflammatory bowel disease, or ulcerative colitis  -drug abuse or addiction  -head injury  -heart or circulation problems  -if you often drink alcohol  -kidney disease or problems going to the bathroom  -liver disease  -lung disease, asthma, or breathing problems  -an unusual or allergic reaction to acetaminophen, hydrocodone, other opioid analgesics, other medicines, foods, dyes, or preservatives  -pregnant or trying to get pregnant  -breast-feeding  How should I use this medicine?  Take this medicine by mouth with a glass of water. Follow the directions on the prescription label. You can take it with or without food. If it upsets your stomach, take it with food. Do not take your medicine more often than directed.  A special MedGuide will be given to you by the pharmacist with each prescription and refill. Be sure to read this information carefully each time.  Talk to your pediatrician regarding the use of this medicine in children. Special care may be needed.  Overdosage: If you think you have taken too much of this medicine contact a poison control center or emergency room at once.  NOTE: This medicine is only  for you. Do not share this medicine with others.  What if I miss a dose?  If you miss a dose, take it as soon as you can. If it is almost time for your next dose, take only that dose. Do not take double or extra doses.  What may interact with this medicine?  This medicine may interact with the following medications:  -alcohol  -antiviral medicines for HIV or AIDS  -atropine  -antihistamines for allergy, cough and cold  -certain antibiotics like erythromycin, clarithromycin  -certain medicines for anxiety or sleep  -certain medicines for bladder problems like oxybutynin, tolterodine  -certain medicines for depression like amitriptyline, fluoxetine, sertraline  -certain medicines for fungal infections like ketoconazole and itraconazole  -certain medicines for Parkinson's disease like benztropine, trihexyphenidyl  -certain medicines for seizures like carbamazepine, phenobarbital, phenytoin, primidone  -certain medicines for stomach problems like dicyclomine, hyoscyamine  -certain medicines for travel sickness like scopolamine  -general anesthetics like halothane, isoflurane, methoxyflurane, propofol  -ipratropium  -local anesthetics like lidocaine, pramoxine, tetracaine  -MAOIs like Carbex, Eldepryl, Marplan, Nardil, and Parnate  -medicines that relax muscles for surgery  -other medicines with acetaminophen  -other narcotic medicines for pain or cough  -phenothiazines like chlorpromazine, mesoridazine, prochlorperazine, thioridazine  -rifampin  This list may not describe all possible interactions. Give your health care provider a list of all the medicines, herbs, non-prescription drugs, or dietary supplements you use. Also tell them if you smoke, drink alcohol, or use illegal drugs. Some items may interact with your medicine.  What should I watch for while using this medicine?  Tell your doctor or health care professional if your pain does not go away, if it gets worse, or if you have new or a different type of pain.  You may develop tolerance to the medicine. Tolerance means that you will need a higher dose of the medicine for pain relief. Tolerance is normal and is expected if you take the medicine for a long time.  Do not suddenly stop taking your medicine because you may develop a severe reaction. Your body becomes used to the medicine. This does NOT mean you are addicted. Addiction is a behavior related to getting and using a drug for a non-medical reason. If you have pain, you have a medical reason to take pain medicine. Your doctor will tell you how much medicine to take. If your doctor wants you to stop the medicine, the dose will be slowly lowered over time to avoid any side effects.  There are different types of narcotic medicines (opiates). If you take more than one type at the same time or if you are taking another medicine that also causes drowsiness, you may have more side effects. Give your health care provider a list of all medicines you use. Your doctor will tell you how much medicine to take. Do not take more medicine than directed. Call emergency for help if you have problems breathing or unusual sleepiness.  Do not take other medicines that contain acetaminophen with this medicine. Always read labels carefully. If you have questions, ask your doctor or pharmacist.  If you take too much acetaminophen get medical help right away. Too much acetaminophen can be very dangerous and cause liver damage. Even if you do not have symptoms, it is important to get help right away.  You may get drowsy or dizzy. Do not drive, use machinery, or do anything that needs mental alertness until you know how this medicine affects you. Do not stand or sit up quickly, especially if you are an older patient. This reduces the risk of dizzy or fainting spells. Alcohol may interfere with the effect of this medicine. Avoid alcoholic drinks.  The medicine will cause constipation. Try to have a bowel movement at least every 2 to 3 days. If  you do not have a bowel movement for 3 days, call your doctor or health care professional.  Your mouth may get dry. Chewing sugarless gum or sucking hard candy, and drinking plenty of water may help. Contact your doctor if the problem does not go away or is severe.  What side effects may I notice from receiving this medicine?  Side effects that you should report to your doctor or health care professional as soon as possible:  -allergic reactions like skin rash, itching or hives, swelling of the face, lips, or tongue  -breathing problems  -confusion  -redness, blistering, peeling or loosening of the skin, including inside the mouth  -signs and symptoms of low blood pressure like dizziness; feeling faint or lightheaded, falls; unusually weak or tired  -trouble passing urine or change in the amount of urine  -yellowing of the eyes or skin  Side effects that usually do not require medical attention (report to your doctor or health care professional if they continue or are bothersome):  -constipation  -dry mouth  -nausea, vomiting  -tiredness  This list may not describe all possible side effects. Call your doctor for medical advice about side effects. You may report side effects to FDA at 0-931-FDA-8834.  Where should I keep my medicine?  Keep out of the reach of children. This medicine can be abused. Keep your medicine in a safe place to protect it from theft. Do not share this medicine with anyone. Selling or giving away this medicine is dangerous and against the law.  This medicine may cause accidental overdose and death if it taken by other adults, children, or pets. Mix any unused medicine with a substance like cat litter or coffee grounds. Then throw the medicine away in a sealed container like a sealed bag or a coffee can with a lid. Do not use the medicine after the expiration date.  Store at room temperature between 15 and 30 degrees C (59 and 86 degrees F).  NOTE: This sheet is a summary. It may not cover all  possible information. If you have questions about this medicine, talk to your doctor, pharmacist, or health care provider.  © 2018 [employer]/Gold Standard (2016-09-09 10:02:16)  Unilateral Salpingo-Oophorectomy, Care After  Refer to this sheet in the next few weeks. These instructions provide you with information on caring for yourself after your procedure. Your health care provider may also give you more specific instructions. Your treatment has been planned according to current medical practices, but problems sometimes occur. Call your health care provider if you have any problems or questions after your procedure.  What can I expect after the procedure?  After the procedure, it is typical to have the following:  · Abdominal pain that can be controlled with pain medicine.  · Vaginal spotting.  · Constipation.  Follow these instructions at home:  · Get plenty of rest and sleep.  · Only take over-the-counter or prescription medicines as directed by your health care provider. Do not take aspirin. It can cause bleeding.  · Keep incision areas clean and dry. Remove or change any bandages (dressings) only as directed by your health care provider.  · Follow your health care provider's advice regarding diet.  · Drink enough fluids to keep your urine clear or pale yellow.  · Limit exercise and activities as directed by your health care provider. Do not lift anything heavier than 5 pounds (2.3 kg) until your health care provider approves.  · Do not drive until your health care provider approves.  · Do not drink alcohol until your health care provider approves.  · Do not have sexual intercourse until your health care provider says it is OK.  · Take your temperature twice a day and write it down.  · If you become constipated, you may:  ¨ Ask your health care provider about taking a mild laxative.  ¨ Add more fruit and bran to your diet.  ¨ Drink more fluids.  · Follow up with your health care provider as directed.  Contact a  health care provider if:  · You have swelling or redness in the incision area.  · You develop a rash.  · You feel lightheaded.  · You have pain that is not controlled with medicine.  · You have pain, swelling, or redness where the IV access tube was placed.  Get help right away if:  · You have a fever.  · You develop increasing abdominal pain.  · You see pus coming out of the incision, or the incision is .  · You notice a bad smell coming from the wound or dressing.  · You have excessive vaginal bleeding.  · You feel sick to your stomach (nauseous) and vomit.  · You have leg or chest pain.  · You have pain when you urinate.  · You develop shortness of breath.  · You pass out.  This information is not intended to replace advice given to you by your health care provider. Make sure you discuss any questions you have with your health care provider.  Document Released: 10/14/2010 Document Revised: 11/17/2017 Document Reviewed: 06/11/2014  [employer] Patient Education © 2017 [employer]

## 2018-03-12 NOTE — OP NOTE
PREOPERATIVE DIAGNOSES:   1.  Right ovarian mass.  2.  Pelvic pain.     POSTOPERATIVE DIAGNOSES:  1.  Right ovarian mass.  2.  Pelvic pain.   3.  Left ovarian cyst.     PROCEDURES:   1.  Laparoscopic right salpingo-oophorectomy.  2.  Left salpingectomy with left cystectomy.       SURGEON: Rochelle Pandey MD    ANESTHESIA: General.     DESCRIPTION OF PROCEDURE: Under adequate anesthesia the patient was placed in the dorsal lithotomy position, prepped and draped in a sterile fashion. Examination revealed no masses that were palpable. The sponge stick was placed in the vagina. The bladder had been drained. Gloves were changed. Attention was turned to the abdomen. Skin incision was made in the umbilicus. The Veress needle was inserted. Proper placement was ensured with hang drop test and aspiration. CO2 was allowed to insufflate. Next the 5 mm trocar was inserted in the umbilicus. She did have an enlarged ovary noted, normal appearing tube, normal anatomy, normal appendix. Her left ovary was also somewhat enlarged and her tube was adherent to her rectosigmoid colon on the left. The 10 mm incision was made 2 cm over the pubic symphysis in the midline. Washings were taken and sent off with heparin added. This had been sent for cytology. Next the 5 mm incision was made just to the left of the midline. The tube and ovary were grasped on the right and the Harmonic was used to remove the tube and ovary on the right. Care was taken to make sure that this was well above the ureter on that side. Next the left tube was freed up and this was done with sharp dissection also using Harmonic. The tube was removed. Next the cyst was noted on that side using Harmonic. Cystectomy was performed removing the entire cyst wall on the left. She had another follicular appearing cyst that was drained. Once this was done, hemostasis was secured by placing SNoW into the base of the cystectomy on the left. The area had been irrigated, hemostasis was  secure. All instruments removed. The Pavel-Rosa was used to close the fascia and the 10 mm port. The skin incisions were then closed with 4-0 and 5-0 Vicryl, Dermabond and Steri-Strips. Patient tolerated procedure well. Sponge stick was removed. Estimated blood loss during the procedure was minimal. She was taken to the recovery room in stable condition.

## 2018-03-12 NOTE — ANESTHESIA PROCEDURE NOTES
Airway  Urgency: elective    Airway not difficult    General Information and Staff    Patient location during procedure: OR  Anesthesiologist: ROBBIE MASSEY  CRNA: KEYUR CISNEROS    Indications and Patient Condition  Indications for airway management: airway protection    Preoxygenated: yes  Mask difficulty assessment: 1 - vent by mask    Final Airway Details  Final airway type: endotracheal airway      Successful airway: ETT  Cuffed: yes   Successful intubation technique: direct laryngoscopy  Facilitating devices/methods: intubating stylet  Endotracheal tube insertion site: oral  Blade: Smith  Blade size: #2  ETT size: 7.0 mm  Cormack-Lehane Classification: grade I - full view of glottis  Placement verified by: chest auscultation and capnometry   Measured from: lips  ETT to lips (cm): 20  Number of attempts at approach: 1    Additional Comments  Atraumatic, MOP to cuff, BSBE, no change to dentition, secured with tape

## 2018-03-12 NOTE — ANESTHESIA POSTPROCEDURE EVALUATION
Patient: Shira Cortes    Procedure Summary     Date:  03/12/18 Room / Location:   [facility]    Anesthesia Start:  1346 Anesthesia Stop:  1457    Procedure:  LAPAROSCOPIC RIGHT SALPINGO OOPHORECTOMY BILATERAL SALPINGECTOMY AND LEFT OVARIAN CYSTECTOMY (N/A Abdomen) Diagnosis:      Surgeon:  Rochelle Pandey MD Provider:  Jazzy Rangel MD    Anesthesia Type:  general ASA Status:  3          Anesthesia Type: general  Last vitals  BP   120/71 (03/12/18 1609)   Temp   36.7 °C (98.1 °F) (03/12/18 1600)   Pulse   54 (03/12/18 1609)   Resp   16 (03/12/18 1609)     SpO2   97 % (03/12/18 1609)     Post Anesthesia Care and Evaluation    Patient location during evaluation: PACU  Patient participation: complete - patient participated  Level of consciousness: awake and alert  Pain management: adequate  Airway patency: patent  Anesthetic complications: No anesthetic complications    Cardiovascular status: acceptable  Respiratory status: acceptable  Hydration status: acceptable    Comments: --------------------            03/12/18               1609     --------------------   BP:       120/71     Pulse:      54       Resp:       16       Temp:                SpO2:      97%      --------------------

## 2018-03-12 NOTE — ANESTHESIA PREPROCEDURE EVALUATION
Anesthesia Evaluation     Patient summary reviewed and Nursing notes reviewed   history of anesthetic complications: PONV  NPO Solid Status: > 8 hours             Airway   Mallampati: II  TM distance: >3 FB  Neck ROM: limited  Dental - normal exam     Pulmonary - normal exam   (+) a smoker Former,     ROS comment: Quit tobacco 2/18  Cardiovascular - normal exam  Exercise tolerance: good (4-7 METS)    ECG reviewed    (+) valvular problems/murmurs,     ROS comment: sr lbbb  S/p open heart for MVR    Neuro/Psych  (+) headaches,     GI/Hepatic/Renal/Endo - negative ROS     Musculoskeletal (-) negative ROS    Abdominal  - normal exam    Bowel sounds: normal.   Substance History - negative use     OB/GYN negative ob/gyn ROS         Other      history of cancer                    Anesthesia Plan    ASA 3     general     intravenous induction   Anesthetic plan and risks discussed with patient.

## 2018-03-12 NOTE — H&P
ADMITTING DIAGNOSES:   1.  Right adnexal mass.  2.  Right lower quadrant pain.     HISTORY OF PRESENT ILLNESS: The patient is a 57-year-old with pelvic pain. This has been occurring gradually over time and is located in her suprapubic area radiating into her hip area and right lower quadrant. Ultrasound has confirmed a mass on the right side. It was cystic in nature, measuring 5 cm. She also had 2 small cysts on the left.     ALLERGIES:  1.  PENICILLIN.  2.  SULFA.     PAST MEDICAL HISTORY:  1.  Kidney stones.   2.  Heart disease.   3.  Osteopenia.  4.  Hemorrhoid.   5.  Dyspareunia.  6.  Dermoid cyst.     PAST SURGICAL HISTORY:  1.  Orthopedic surgery.  2.  Heart surgery.  3.  Laparoscopic assisted vaginal hysterectomy in 2003.   4.  Lithotripsy in 2016.     SOCIAL HISTORY: Negative for smoking, she stopped about a month ago.     MEDICATIONS:  1.  Toprol XL 25 mg.  2.  Aspirin 81 mg daily.    FAMILY HISTORY: Significant for heart disease.     PHYSICAL EXAMINATION:  HEENT: Within normal limits.  LUNGS: Clear.  HEART: Regular rate and rhythm.   ABDOMEN: Soft. Some tenderness in the right lower quadrant. Bimanual exam shows a well healed vaginal cuff. Some tenderness in the right lower quadrant but no masses are palpable on exam.     ASSESSMENT/PLAN: Patient with a right ovarian mass, also 2 small cysts on the left. We will proceed with laparoscopic right salpingo-oophorectomy, possible left ovarian cystectomy if needed, bilateral salpingectomies will be performed.

## 2018-03-13 LAB
CYTO UR: NORMAL
LAB AP CASE REPORT: NORMAL
Lab: NORMAL
PATH REPORT.FINAL DX SPEC: NORMAL
PATH REPORT.GROSS SPEC: NORMAL

## 2018-03-14 LAB
CYTO UR: NORMAL
LAB AP CASE REPORT: NORMAL
LAB AP CLINICAL INFORMATION: NORMAL
Lab: NORMAL
PATH REPORT.FINAL DX SPEC: NORMAL
PATH REPORT.GROSS SPEC: NORMAL

## 2018-07-10 RX ORDER — METOPROLOL SUCCINATE 25 MG
TABLET, EXTENDED RELEASE 24 HR ORAL
Qty: 45 TABLET | Refills: 0 | Status: SHIPPED | OUTPATIENT
Start: 2018-07-10 | End: 2018-07-12 | Stop reason: SDUPTHER

## 2018-07-12 RX ORDER — METOPROLOL SUCCINATE 25 MG
TABLET, EXTENDED RELEASE 24 HR ORAL
Qty: 45 TABLET | Refills: 0 | Status: SHIPPED | OUTPATIENT
Start: 2018-07-12 | End: 2019-01-08 | Stop reason: SDUPTHER

## 2018-07-13 RX ORDER — METOPROLOL SUCCINATE 25 MG
TABLET, EXTENDED RELEASE 24 HR ORAL
Qty: 45 TABLET | Refills: 2 | OUTPATIENT
Start: 2018-07-13

## 2018-12-14 RX ORDER — METOPROLOL SUCCINATE 25 MG/1
12.5 TABLET, EXTENDED RELEASE ORAL DAILY
Qty: 45 TABLET | Refills: 0 | Status: SHIPPED | OUTPATIENT
Start: 2018-12-14 | End: 2019-04-11 | Stop reason: CLARIF

## 2019-01-08 RX ORDER — METOPROLOL SUCCINATE 25 MG
TABLET, EXTENDED RELEASE 24 HR ORAL
Qty: 45 TABLET | Refills: 0 | Status: SHIPPED | OUTPATIENT
Start: 2019-01-08 | End: 2019-04-03 | Stop reason: SDUPTHER

## 2019-04-03 ENCOUNTER — OFFICE VISIT (OUTPATIENT)
Dept: CARDIOLOGY | Facility: CLINIC | Age: 59
End: 2019-04-03

## 2019-04-03 VITALS
HEART RATE: 65 BPM | BODY MASS INDEX: 24.28 KG/M2 | SYSTOLIC BLOOD PRESSURE: 100 MMHG | HEIGHT: 64 IN | DIASTOLIC BLOOD PRESSURE: 60 MMHG | WEIGHT: 142.2 LBS

## 2019-04-03 DIAGNOSIS — I42.2 CARDIOMYOPATHY, HYPERTROPHIC, PRIMARY FAMILIAL (HCC): Primary | ICD-10-CM

## 2019-04-03 PROCEDURE — 93000 ELECTROCARDIOGRAM COMPLETE: CPT | Performed by: INTERNAL MEDICINE

## 2019-04-03 PROCEDURE — 99214 OFFICE O/P EST MOD 30 MIN: CPT | Performed by: INTERNAL MEDICINE

## 2019-04-03 RX ORDER — ELETRIPTAN HYDROBROMIDE 20 MG/1
TABLET, FILM COATED ORAL AS NEEDED
Refills: 0 | COMMUNITY
Start: 2019-02-13 | End: 2020-10-07

## 2019-04-03 NOTE — PROGRESS NOTES
Date of Office Visit: 2019  Encounter Provider: Jacqui Palencia MD  Place of Service: [facility] CARDIOLOGY  Patient Name: Shira Cortes  :1960      Patient ID:  Shira Cortes is a 58 y.o. female is here for  followup for hypertrophic cardiomyopathy.         History of Present Illness    She was first evaluated on 2010 secondary to complaints of dizziness, and near  syncope with exertion. Her echocardiogram showed marked septal hypertrophy with systolic  anterior movement of the anterior mitral leaflet and severe left ventricular outflow tract  obstruction with a peak gradient of 72 mmHg. There was grade II diastolic dysfunction and  a hyperdynamic small size left ventricle. There was also mild mitral insufficiency. She  had a cardiac catheterization performed on 2010 which showed normal coronaries and  hypertrophic myopathy. She then had a Holter monitor recording performed which showed  premature ventricular complexes. She also had a stress echocardiogram performed which  showed hypotension with exercise and severe systolic anterior movement of the mitral  leaflet at rest with exercise and with exercise. The left ventricular outflow tract  gradient increased with exercise as well as the number of premature ventricular complexes.  She also had nonsustained ventricular tachycardia with exercise.      She was referred to [facility] for a septal myectomy which she has had performed on  2010. She had septal myectomy with plication of A2 of mitral valve leaflet and  resection of the accessory papillary muscle head to the A3 segment of the mitral valve  performed by Dr. Anselmo Diop at the [facility]. She had a cardiac MRI, which  confirmed hypertrophic cardiomyopathy. Her postoperative echocardiogram showed an  ejection fraction of 65%, no systolic anterior movement of the mitral leaflet and 1+  mitral insufficiency. Her resting left  ventricular outflow tract gradient was 26 mmHg and  there was no increase in her mitral insufficiency and left ventricular outflow tract  gradient after amyl nitrite.     She had an echocardiogram done 12/5/17 showing ejection fraction of 55% with mild mitral insufficiency status post surgical mitral valve repair with restricted movement of the posterior leaflet.  There was noted systolic anterior motion of the coral apparatus but no significant LVOT obstruction is seen.  The LVOT gradient with Valsalva was 11 mmHg and 9 mmHg with rest.    She is exercising.  She has no chest pain or pressure.  She had no tachycardia.  She has occasional palpitations.  She has had no dizziness or syncope.  Because of her hypertrophic cardiomyopathy, it is recommended that she remain on Toprol as this has the best information for preventing ventricular tachycardia in the setting of hypertrophic cardiomyopathy.       Past Medical History:   Diagnosis Date   • Basal cell carcinoma 2015    RIGHT CLAVICLE   • Diastolic dysfunction    • Frequent headaches    • History of kidney stones    • Hypertrophic cardiomyopathy (CMS/HCC)    • Mass of right ovary    • Migraines    • Mitral valve insufficiency    • Pelvic pain    • PONV (postoperative nausea and vomiting)    • PVC (premature ventricular contraction)    • Ventricular tachyarrhythmia (CMS/HCC)          Past Surgical History:   Procedure Laterality Date   • DIAGNOSTIC LAPAROSCOPY N/A 3/12/2018    Procedure: LAPAROSCOPIC RIGHT SALPINGO OOPHORECTOMY BILATERAL SALPINGECTOMY AND LEFT OVARIAN CYSTECTOMY;  Surgeon: Rochelle Pandey MD;  Location: [facility] OR [facility];  Service: Gynecology   • HYSTERECTOMY     • INFERIOR OBLIQUE MYECTOMY     • MITRAL VALVE REPAIR/REPLACEMENT     • SKIN CANCER EXCISION     • URETEROSCOPY LASER LITHOTRIPSY WITH STENT INSERTION         Current Outpatient Medications on File Prior to Visit   Medication Sig Dispense Refill   • aspirin 81 MG tablet Take 81 mg by mouth Daily.      • eletriptan (RELPAX) 20 MG tablet As Needed.  0   • metoprolol succinate XL (TOPROL-XL) 25 MG 24 hr tablet Take 0.5 tablets by mouth Daily. 45 tablet 0   • buPROPion SR (WELLBUTRIN SR) 100 MG 12 hr tablet Take 100 mg by mouth Daily.     • HYDROcodone-acetaminophen (NORCO) 5-325 MG per tablet Take 1-2 tablets by mouth Every 4 (Four) Hours As Needed (Pain). 25 tablet 0   • [DISCONTINUED] TOPROL XL 25 MG 24 hr tablet TAKE A HALF TABLET BY MOUTH EVERY DAY DO NOT SUB PER MD 45 tablet 0     No current facility-administered medications on file prior to visit.        Social History     Socioeconomic History   • Marital status:      Spouse name: Not on file   • Number of children: Not on file   • Years of education: Not on file   • Highest education level: Not on file   Tobacco Use   • Smoking status: Former Smoker     Packs/day: 0.50     Years: 40.00     Pack years: 20.00     Types: Cigarettes     Last attempt to quit: 2018     Years since quittin.1   • Smokeless tobacco: Never Used   Substance and Sexual Activity   • Alcohol use: Yes     Comment: RARE   • Drug use: No     Comment: CAFFEINE USE    • Sexual activity: Defer           Review of Systems   Constitution: Negative.   HENT: Negative for congestion.    Eyes: Negative for vision loss in left eye and vision loss in right eye.   Respiratory: Negative.  Negative for cough, hemoptysis, shortness of breath, sleep disturbances due to breathing, snoring, sputum production and wheezing.    Endocrine: Negative.    Hematologic/Lymphatic: Negative.    Skin: Negative for poor wound healing and rash.   Musculoskeletal: Positive for joint pain. Negative for falls, gout, muscle cramps and myalgias.   Gastrointestinal: Negative for abdominal pain, diarrhea, dysphagia, hematemesis, melena, nausea and vomiting.   Neurological: Negative for excessive daytime sleepiness, dizziness, headaches, light-headedness, loss of balance, seizures and vertigo.  "  Psychiatric/Behavioral: Negative for depression and substance abuse. The patient is not nervous/anxious.        Procedures    ECG 12 Lead  Date/Time: 4/3/2019 1:28 PM  Performed by: Jacqui Palencia MD  Authorized by: Jacqui Palencia MD   Comparison: compared with previous ECG   Similar to previous ECG  Rhythm: sinus rhythm  Ectopy: unifocal PVCs  Conduction: left bundle branch block  Other findings: left ventricular hypertrophy    Clinical impression: abnormal EKG                Objective:      Vitals:    04/03/19 1308   BP: 100/60   BP Location: Right arm   Patient Position: Sitting   Pulse: 65   Weight: 64.5 kg (142 lb 3.2 oz)   Height: 162.6 cm (64\")     Body mass index is 24.41 kg/m².    Physical Exam   Constitutional: She is oriented to person, place, and time. She appears well-developed and well-nourished. No distress.   HENT:   Head: Normocephalic and atraumatic.   Eyes: Conjunctivae are normal. No scleral icterus.   Neck: Neck supple. No JVD present. Carotid bruit is not present. No thyromegaly present.   Cardiovascular: Normal rate, regular rhythm, S1 normal, S2 normal, normal heart sounds and intact distal pulses.  No extrasystoles are present. PMI is not displaced. Exam reveals no gallop.   No murmur heard.  Pulses:       Carotid pulses are 2+ on the right side, and 2+ on the left side.       Radial pulses are 2+ on the right side, and 2+ on the left side.        Dorsalis pedis pulses are 2+ on the right side, and 2+ on the left side.        Posterior tibial pulses are 2+ on the right side, and 2+ on the left side.   Pulmonary/Chest: Effort normal and breath sounds normal. No respiratory distress. She has no wheezes. She has no rhonchi. She has no rales. She exhibits no tenderness.   Abdominal: Soft. Bowel sounds are normal. She exhibits no distension, no abdominal bruit and no mass. There is no tenderness.   Musculoskeletal: She exhibits no edema or deformity.   Lymphadenopathy:     She has " no cervical adenopathy.   Neurological: She is alert and oriented to person, place, and time. No cranial nerve deficit.   Skin: Skin is warm and dry. No rash noted. She is not diaphoretic. No cyanosis. No pallor. Nails show no clubbing.   Psychiatric: She has a normal mood and affect. Judgment normal.   Vitals reviewed.      Lab Review:       Assessment:      Diagnosis Plan   1. Cardiomyopathy, hypertrophic, primary familial (CMS/HCC)       1. Hypertrophic cardiomyopathy, s/p Septal myectomy with plication of A2 of the mitral leaflet and resection of the accessory papillary muscle head to A3 of the mitral valve done 12/29/2010 at [facility].  Has no LVOT obstruction.   2. Mild mitral insufficiency, stable.      Plan:       F/u with camacho in 1 year.  No changes.

## 2019-04-11 RX ORDER — METOPROLOL SUCCINATE 25 MG
TABLET, EXTENDED RELEASE 24 HR ORAL
Qty: 45 TABLET | Refills: 0 | Status: SHIPPED | OUTPATIENT
Start: 2019-04-11 | End: 2019-04-26 | Stop reason: CLARIF

## 2019-04-22 ENCOUNTER — APPOINTMENT (OUTPATIENT)
Dept: WOMENS IMAGING | Facility: HOSPITAL | Age: 59
End: 2019-04-22

## 2019-04-22 PROCEDURE — 77067 SCR MAMMO BI INCL CAD: CPT | Performed by: RADIOLOGY

## 2019-04-22 PROCEDURE — 77063 BREAST TOMOSYNTHESIS BI: CPT | Performed by: RADIOLOGY

## 2019-04-26 ENCOUNTER — TELEPHONE (OUTPATIENT)
Dept: CARDIOLOGY | Facility: CLINIC | Age: 59
End: 2019-04-26

## 2019-04-26 NOTE — TELEPHONE ENCOUNTER
Appeal for pt Toprol XL 25 mg has been denied. Called and informed pt. She wants to know if there is alternative medication that she can try?.....Shannen HALLMAN

## 2019-05-09 ENCOUNTER — APPOINTMENT (OUTPATIENT)
Dept: WOMENS IMAGING | Facility: HOSPITAL | Age: 59
End: 2019-05-09

## 2019-05-09 PROCEDURE — 77061 BREAST TOMOSYNTHESIS UNI: CPT | Performed by: RADIOLOGY

## 2019-05-09 PROCEDURE — 76641 ULTRASOUND BREAST COMPLETE: CPT | Performed by: RADIOLOGY

## 2019-05-09 PROCEDURE — 77065 DX MAMMO INCL CAD UNI: CPT | Performed by: RADIOLOGY

## 2019-05-09 PROCEDURE — G0279 TOMOSYNTHESIS, MAMMO: HCPCS | Performed by: RADIOLOGY

## 2019-08-14 ENCOUNTER — TELEPHONE (OUTPATIENT)
Dept: CARDIOLOGY | Facility: CLINIC | Age: 59
End: 2019-08-14

## 2019-08-14 NOTE — TELEPHONE ENCOUNTER
Notified patient of recommendations. Patient verbalized understanding.    Stefania José, RN  Triage RN [facility]

## 2019-08-14 NOTE — TELEPHONE ENCOUNTER
"08/14/19  11:59 AM  Shira RUDD Sebastian  1960  Home Phone [phone]   Work Phone [phone]   Mobile [phone]       Dr. Palencia,    Shira Sebastian called today. She said she has been \"feeling weird\". She said she can't explain the feeling, but she went to a health screening at work today and her HR was 51, B/P 103/68. She said that's a low B/P and HR for her.    Meds reviewed:  Metoprolol 25 BID    She did tell me also how much trouble you had with the insurance company when you tried to keep her on the Toprol XL.    How would you like me to move forward with this?    Stefania José, RN  Triage RN [facility]            "

## 2020-04-06 ENCOUNTER — TELEMEDICINE (OUTPATIENT)
Dept: CARDIOLOGY | Facility: CLINIC | Age: 60
End: 2020-04-06

## 2020-04-06 VITALS
DIASTOLIC BLOOD PRESSURE: 74 MMHG | BODY MASS INDEX: 24.14 KG/M2 | HEART RATE: 57 BPM | HEIGHT: 64 IN | SYSTOLIC BLOOD PRESSURE: 116 MMHG | WEIGHT: 141.4 LBS

## 2020-04-06 DIAGNOSIS — I34.0 NONRHEUMATIC MITRAL VALVE REGURGITATION: Primary | ICD-10-CM

## 2020-04-06 DIAGNOSIS — I51.89 DIASTOLIC DYSFUNCTION: ICD-10-CM

## 2020-04-06 DIAGNOSIS — Z72.0 TOBACCO USE: ICD-10-CM

## 2020-04-06 DIAGNOSIS — I42.2 CARDIOMYOPATHY, HYPERTROPHIC, PRIMARY FAMILIAL (HCC): ICD-10-CM

## 2020-04-06 DIAGNOSIS — E78.00 ELEVATED CHOLESTEROL: ICD-10-CM

## 2020-04-06 PROBLEM — J20.9 ACUTE BRONCHITIS: Status: RESOLVED | Noted: 2017-07-27 | Resolved: 2020-04-06

## 2020-04-06 PROCEDURE — 99214 OFFICE O/P EST MOD 30 MIN: CPT | Performed by: NURSE PRACTITIONER

## 2020-04-06 RX ORDER — METOPROLOL SUCCINATE 25 MG/1
12.5 TABLET, EXTENDED RELEASE ORAL DAILY
Qty: 15 TABLET | Refills: 3 | Status: SHIPPED | OUTPATIENT
Start: 2020-04-06 | End: 2020-06-30

## 2020-04-06 NOTE — PROGRESS NOTES
Telehealth Video Visit    Date of Visit: 2020  Encounter Provider: GERMAINE Membreno  Place of Service: [facility] CARDIOLOGY  Patient Name: Shira Cortes  :1960  Primary Cardiologist: Dr. Jacqui Palencia    Chief Complaint   Patient presents with   • Annual Exam   • Follow-up       HPI: Shira Cortes is a pleasant 59 y.o. female who is an established patient of our practice and today is her annual follow up visit. Due to COVID-19 virus, I am conducting a telehealth visit via telephone with patient and he has consented to this visit today. She is a new patient to me and her previous records have been reviewed.    In 2010, she had dizziness and a near syncopal episode with exertion.  An echocardiogram completed at that time showed marked septal hypertrophy, systolic anterior movement of the anterior mitral leaflet, severe LVOT, grade 2 diastolic dysfunction, mild mitral insufficiency.  Cardiac catheterization performed 2010 showed normal coronary arteries and hypertrophic cardiomyopathy.  Stress echocardiogram showed LVOT gradient increased with exercise and PVCs and nonsustained ventricular tachycardia also occurred. She was referred to the [facility].  On 2010 she underwent septal myectomy.    In 2017, she had a repeat echocardiogram which revealed an EF of 55%, mild mitral insufficiency, restricted movement of the posterior mitral leaflet, systolic anterior motion of the chordal apparatus, but no significant LVOT noted.    In 2019, she followed up in the office with Dr. Palencia.  She was recommended to continue with metoprolol for prevention of nonsustained VT in the setting of hypertrophic cardiomyopathy.    Overall, she feels that she is doing well from a cardiac standpoint.  She quit smoking about a week ago and was switched to the long-acting bupropion.  She has noticed occasional palpitations at the base of her  throat that are nonsustained and thinks it may be related to the bupropion.  On occasion she experiences some lightheadedness.  She denies chest pain, shortness of air, PND, orthopnea, cough, wheezing, edema, syncope, or bleeding.  She is exercising with [employer] and describes herself as a healthy eater.  She sees a PA at her work through [employer] and has routine blood work completed there once per year.  She says she was once on metoprolol succinate and this was discontinued because of insurance cost.  She felt better on the longer acting metoprolol and wishes to switch back.  She also is tired throughout the day and takes melatonin and her beta-blocker at nighttime.    ADDENDUM 4/9/2020: Blood work completed 2/7/2020 included a CBC which showed normal hemoglobin and hematocrit.  CMP normal except for potassium of 5.5.  TSH normal.  Vitamin D low at 26.  Total cholesterol 231, triglycerides 97, .      Past Medical History:   Diagnosis Date   • Abnormal ECG 2004   • Basal cell carcinoma 2015    RIGHT CLAVICLE   • Congenital heart disease 2009    HCM runs in family mother, brother   • Diastolic dysfunction    • Frequent headaches    • Heart murmur 2009   • History of kidney stones    • Hypertrophic cardiomyopathy (CMS/HCC)    • Mass of right ovary    • Migraines    • Mitral valve insufficiency    • Mitral valve prolapse 2009    had open heart 2009   • Pelvic pain    • PONV (postoperative nausea and vomiting)    • PVC (premature ventricular contraction)    • Ventricular tachyarrhythmia (CMS/HCC)        Past Surgical History:   Procedure Laterality Date   • CARDIAC CATHETERIZATION  2009    pre op   • DIAGNOSTIC LAPAROSCOPY N/A 3/12/2018    Procedure: LAPAROSCOPIC RIGHT SALPINGO OOPHORECTOMY BILATERAL SALPINGECTOMY AND LEFT OVARIAN CYSTECTOMY;  Surgeon: Rochelle Pandey MD;  Location: [facility] OR [facility] [place];  Service: Gynecology   • HYSTERECTOMY     • INFERIOR OBLIQUE MYECTOMY     • MITRAL VALVE REPAIR/REPLACEMENT    "  • SKIN CANCER EXCISION     • URETEROSCOPY LASER LITHOTRIPSY WITH STENT INSERTION         Social History     Socioeconomic History   • Marital status:      Spouse name: Not on file   • Number of children: Not on file   • Years of education: Not on file   • Highest education level: Not on file   Tobacco Use   • Smoking status: Former Smoker     Packs/day: 0.50     Years: 40.00     Pack years: 20.00     Types: Cigarettes     Last attempt to quit: 3/30/2020     Years since quittin.0   • Smokeless tobacco: Never Used   Substance and Sexual Activity   • Alcohol use: Not Currently     Comment: Caffeine use: 2 cups daily   • Drug use: No     Comment: CAFFEINE USE    • Sexual activity: Not Currently     Partners: Male     Birth control/protection: Post-menopausal       Family History   Problem Relation Age of Onset   • Heart disease Other    • Heart failure Other    • Atrial fibrillation Other    • Arrhythmia Brother    • Heart disease Brother    • Heart disease Mother         passed in  end stage HCM   • Malig Hyperthermia Neg Hx        The following portion of the patient's history were reviewed and updated as appropriate: past medical history, past surgical history, past social history, past family history, allergies, current medications, and problem list.    Review of Systems   Constitution: Positive for malaise/fatigue.   Cardiovascular: Positive for palpitations. Negative for chest pain, dyspnea on exertion, irregular heartbeat, leg swelling, near-syncope, orthopnea, paroxysmal nocturnal dyspnea and syncope.   Neurological: Positive for excessive daytime sleepiness and light-headedness. Negative for dizziness.       Allergies   Allergen Reactions   • Sulfa Antibiotics Hives   • Codeine Other (See Comments)     \"JUST MADE ME FEEL BAD\"  \"JUST MADE ME FEEL BAD\"     • Penicillins Hives         Current Outpatient Medications:   •  aspirin 81 MG tablet, Take 81 mg by mouth Daily., Disp: , Rfl:   •  " "eletriptan (RELPAX) 20 MG tablet, As Needed., Disp: , Rfl: 0  •  metoprolol tartrate (LOPRESSOR) 25 MG tablet, Take 0.5 tablets by mouth Daily., Disp: 45 tablet, Rfl: 0        Objective:     Vitals:    04/06/20 0958   BP: 116/74   BP Location: Left arm   Pulse: 57   Weight: 64.1 kg (141 lb 6.4 oz)   Height: 162.6 cm (64\")     Body mass index is 24.27 kg/m².    Due to telehealth visit, there was no EKG, vitals, or weight performed in our office.  Vitals/Weight were reported by the patient and conducted at home.    PHYSICAL EXAM:    Vitals Reviewed.   General Appearance: No acute distress, well developed and well nourished.   Eyes: Conjunctivae and lids: No erythema, swelling, or discharge. Sclerae anicteric.   HENT: Atraumatic, normocephalic. External eyes, ears, and nose normal. No hearing loss noted. Mucous membranes normal. Lips not cyanotic. Neck supple with no tenderness.  Respiratory: No signs of respiratory distress.  Musculoskeletal: Normal movement of extremities  Skin and Nails: General appearance normal. No pallor, cyanosis, diaphoresis.    Psychiatric: Patient alert and oriented to person, place, and time. Speech and behavior appropriate. Normal mood and affect.       Assessment:       Diagnosis Plan   1. Nonrheumatic mitral valve regurgitation     2. Cardiomyopathy, hypertrophic, primary familial (CMS/HCC)     3. Diastolic dysfunction     4. Elevated cholesterol     5. Tobacco use            Plan:       1.  Mitral Valve Repair: S/p Septal myectomy with plication of A2 of the mitral leaflet and resection of the accessory papillary muscle head to A3 of the mitral valve done 12/29/2010 at [facility].  Last echocardiogram in 2017 showed stable mitral valve and no LVOT.  I will discuss when Dr. Palencia wants to repeat an echocardiogram.  Patient wants to switch back from metoprolol tartrate to the succinate.  This was discontinued in the past because of the cost.  All started on metoprolol succinate " 25 mg half tablet at nighttime.  I will check back with her in a couple of weeks to see how she is tolerating the medication and I have asked her to check her blood pressure and heart rate at home.    2.  Hypertrophic Cardiomyopathy: Stable per last echocardiogram.    3.  Diastolic Dysfunction: Improved after open heart surgery.  I recommended good blood pressure control.    4.  Hyperlipidemia: She reports that her cholesterol is high and we will request a copy of her last blood work.  We talked about how smoking can increase her cholesterol levels so she wants to go smoke-free and then have it rechecked in 3 to 6 months.    5.  Tobacco Use: She quit smoking about a week ago and I have applauded her efforts.  Smoking cessation recommended.    6.  Today is a video visit and she is typically seen once per year in the office.  I have recommended a follow-up visit in the office in 4-6 months.    This patient has consented to a telehealth visit via video. The visit was scheduled as a video visit to comply with patient safety concerns in accordance with CDC recommendations.  All vitals recorded within this visit are reported by the patient.  I spent  30 minutes in total including but not limited to the 20 minutes spent in direct conversation with this patient.     As always, it has been a pleasure to participate in your patient's care. Thank you.     ADDENDUM 4/7/2020: Dr. Palencia said we will order an echocardiogram on her next office visit.      Sincerely,         GERMAINE Sanchez        Dictated utilizing Dragon dictation

## 2020-04-07 DIAGNOSIS — I34.0 NONRHEUMATIC MITRAL VALVE REGURGITATION: ICD-10-CM

## 2020-04-07 DIAGNOSIS — I42.2 CARDIOMYOPATHY, HYPERTROPHIC, PRIMARY FAMILIAL (HCC): Primary | ICD-10-CM

## 2020-04-13 ENCOUNTER — TELEPHONE (OUTPATIENT)
Dept: CARDIOLOGY | Facility: CLINIC | Age: 60
End: 2020-04-13

## 2020-04-13 NOTE — TELEPHONE ENCOUNTER
----- Message from GERMAINE Osorio sent at 4/10/2020  1:34 PM EDT -----      ----- Message -----  From: Meme Jane APRN  Sent: 4/7/2020  12:39 PM EDT  To: GERMAINE Osorio, Garry Canseco      Please schedule patient for echocardiogram and appointment with myself or Dr. Palencia in 4-6 months.  Thank you    ----- Message -----  From: Meme Jane APRN  Sent: 4/6/2020  11:02 AM EDT  To: GERMAINE Osorio      Follow-up on Dr. Palencia's response about echo and schedule new appointment

## 2020-04-13 NOTE — TELEPHONE ENCOUNTER
Please schedule patient for echocardiogram and appointment with myself or Dr. Palencia in 4-6 months.  Thank you

## 2020-04-22 ENCOUNTER — TELEPHONE (OUTPATIENT)
Dept: CARDIOLOGY | Facility: CLINIC | Age: 60
End: 2020-04-22

## 2020-04-22 NOTE — TELEPHONE ENCOUNTER
----- Message from GERMAINE Osorio sent at 4/6/2020 11:04 AM EDT -----    Check to see how patient is tolerating metoprolol succinate.

## 2020-04-22 NOTE — TELEPHONE ENCOUNTER
I called patient to see how she was doing since she wanted to switch from metoprolol tartrate back to metoprolol succinate.  She said she has not switched yet because she had so many of the tablets of metoprolol tartrate that she wanted to finish those out first.  I told her to call with any further concerns when she makes the switch.

## 2020-06-30 RX ORDER — METOPROLOL SUCCINATE 25 MG/1
TABLET, EXTENDED RELEASE ORAL
Qty: 45 TABLET | Refills: 1 | Status: SHIPPED | OUTPATIENT
Start: 2020-06-30 | End: 2021-05-10

## 2020-07-01 ENCOUNTER — APPOINTMENT (OUTPATIENT)
Dept: WOMENS IMAGING | Facility: HOSPITAL | Age: 60
End: 2020-07-01

## 2020-07-01 PROCEDURE — 77063 BREAST TOMOSYNTHESIS BI: CPT | Performed by: RADIOLOGY

## 2020-07-01 PROCEDURE — 77067 SCR MAMMO BI INCL CAD: CPT | Performed by: RADIOLOGY

## 2020-10-07 ENCOUNTER — HOSPITAL ENCOUNTER (OUTPATIENT)
Dept: CARDIOLOGY | Facility: HOSPITAL | Age: 60
Discharge: HOME | End: 2020-10-07
Admitting: INTERNAL MEDICINE

## 2020-10-07 ENCOUNTER — OFFICE VISIT (OUTPATIENT)
Dept: CARDIOLOGY | Facility: CLINIC | Age: 60
End: 2020-10-07

## 2020-10-07 VITALS
DIASTOLIC BLOOD PRESSURE: 68 MMHG | HEIGHT: 64 IN | RESPIRATION RATE: 18 BRPM | HEART RATE: 54 BPM | SYSTOLIC BLOOD PRESSURE: 100 MMHG | WEIGHT: 148.2 LBS | BODY MASS INDEX: 25.3 KG/M2

## 2020-10-07 VITALS
SYSTOLIC BLOOD PRESSURE: 110 MMHG | WEIGHT: 147 LBS | HEART RATE: 70 BPM | DIASTOLIC BLOOD PRESSURE: 60 MMHG | HEIGHT: 64 IN | BODY MASS INDEX: 25.1 KG/M2

## 2020-10-07 DIAGNOSIS — I42.2 CARDIOMYOPATHY, HYPERTROPHIC, PRIMARY FAMILIAL (HCC): Primary | ICD-10-CM

## 2020-10-07 DIAGNOSIS — I34.0 NONRHEUMATIC MITRAL VALVE REGURGITATION: ICD-10-CM

## 2020-10-07 DIAGNOSIS — I42.2 CARDIOMYOPATHY, HYPERTROPHIC, PRIMARY FAMILIAL (HCC): ICD-10-CM

## 2020-10-07 DIAGNOSIS — E78.00 ELEVATED CHOLESTEROL: ICD-10-CM

## 2020-10-07 DIAGNOSIS — I51.89 DIASTOLIC DYSFUNCTION: ICD-10-CM

## 2020-10-07 DIAGNOSIS — Z72.0 TOBACCO USE: ICD-10-CM

## 2020-10-07 PROCEDURE — 93000 ELECTROCARDIOGRAM COMPLETE: CPT | Performed by: INTERNAL MEDICINE

## 2020-10-07 PROCEDURE — 93356 MYOCRD STRAIN IMG SPCKL TRCK: CPT | Performed by: INTERNAL MEDICINE

## 2020-10-07 PROCEDURE — 93356 MYOCRD STRAIN IMG SPCKL TRCK: CPT

## 2020-10-07 PROCEDURE — 99214 OFFICE O/P EST MOD 30 MIN: CPT | Performed by: INTERNAL MEDICINE

## 2020-10-07 PROCEDURE — 93306 TTE W/DOPPLER COMPLETE: CPT

## 2020-10-07 PROCEDURE — 93306 TTE W/DOPPLER COMPLETE: CPT | Performed by: INTERNAL MEDICINE

## 2020-10-07 RX ORDER — ASCORBIC ACID 500 MG
500 TABLET ORAL DAILY
COMMUNITY

## 2020-10-07 RX ORDER — ACETAMINOPHEN 160 MG
2000 TABLET,DISINTEGRATING ORAL DAILY
COMMUNITY
End: 2021-10-06

## 2020-10-07 RX ORDER — LANOLIN ALCOHOL/MO/W.PET/CERES
400 CREAM (GRAM) TOPICAL DAILY
COMMUNITY
End: 2022-10-05 | Stop reason: ALTCHOICE

## 2020-10-07 RX ORDER — BUPROPION HYDROCHLORIDE 150 MG/1
150 TABLET ORAL DAILY
COMMUNITY
End: 2021-10-06

## 2020-10-07 NOTE — PROGRESS NOTES
Date of Office Visit: 10/07/2020  Encounter Provider: Jacqui Palencia MD  Place of Service: [facility] CARDIOLOGY  Patient Name: Shira Cortes  :1960      Patient ID:  Shira Cortes is a 60 y.o. female is here for  followup for hypertrophic obstructive cardiomyopathy.        History of Present Illness    She was first evaluated on 2010 secondary to complaints of dizziness, and near  syncope with exertion. Her echocardiogram showed marked septal hypertrophy with systolic  anterior movement of the anterior mitral leaflet and severe left ventricular outflow tract  obstruction with a peak gradient of 72 mmHg. There was grade II diastolic dysfunction and  a hyperdynamic small size left ventricle. There was also mild mitral insufficiency. She  had a cardiac catheterization performed on 2010 which showed normal coronaries and  hypertrophic myopathy. She then had a Holter monitor recording performed which showed  premature ventricular complexes. She also had a stress echocardiogram performed which  showed hypotension with exercise and severe systolic anterior movement of the mitral  leaflet at rest with exercise and with exercise. The left ventricular outflow tract  gradient increased with exercise as well as the number of premature ventricular complexes.  She also had nonsustained ventricular tachycardia with exercise.      She was referred to [facility] for a septal myectomy which she has had performed on  2010. She had septal myectomy with plication of A2 of mitral valve leaflet and  resection of the accessory papillary muscle head to the A3 segment of the mitral valve  performed by Dr. Anselmo Diop at the [facility]. She had a cardiac MRI, which  confirmed hypertrophic cardiomyopathy. Her postoperative echocardiogram showed an  ejection fraction of 65%, no systolic anterior movement of the mitral leaflet and 1+  mitral insufficiency. Her  resting left ventricular outflow tract gradient was 26 mmHg and  there was no increase in her mitral insufficiency and left ventricular outflow tract  gradient after amyl nitrite.      She had an echocardiogram done 12/5/17 showing ejection fraction of 55% with mild mitral insufficiency status post surgical mitral valve repair with restricted movement of the posterior leaflet.  There was noted systolic anterior motion of the coral apparatus but no significant LVOT obstruction is seen.  The LVOT gradient with Valsalva was 11 mmHg and 9 mmHg with rest.    Echo done 10/10/2020 shows moderate concentric left ventricular hypertrophy with no evidence of left ventricular outflow tract gradient and apical hypertrophy noted.  Her ejection fraction was 51-55% with severe left atrial enlargement, septal hypokinesis, mild to moderate mitral insufficiency with a prior mitral repair.  Overall looks very stable.     Is doing well.  She is smoking 9 cigarettes a day.  She is exercising 20 minutes every other day on elliptical and bike.  She is doing physical therapy.  Her primary care provider now is Neva Cohen with [facility].  She has had no tachycardia, dizziness or syncope.  She has no exertional chest tightness or pressure.  She has no orthopnea or PND.  She has no exertional dyspnea.  She is actually feeling quite well.    Labs on 2/7/2020 show normal CBC, potassium 4 at 5.5, otherwise normal CMP, total cholesterol 231, HDL 52, triglycerides 97, , cholesterol HDL ratio less than 5 at 4.4, non-, normal TSH, hemoglobin A1c 5.4.        Past Medical History:   Diagnosis Date   • Abnormal ECG 2004   • Basal cell carcinoma 2015    RIGHT CLAVICLE   • Congenital heart disease 2009    HCM runs in family mother, brother   • Diastolic dysfunction    • Frequent headaches    • Heart murmur 2009   • History of kidney stones    • Hypertrophic cardiomyopathy (CMS/HCC)    • Mass of right ovary    • Migraines    • Mitral  valve insufficiency    • Mitral valve prolapse     had open heart    • Pelvic pain    • PONV (postoperative nausea and vomiting)    • PVC (premature ventricular contraction)    • Ventricular tachyarrhythmia (CMS/HCC)          Past Surgical History:   Procedure Laterality Date   • CARDIAC CATHETERIZATION      pre op   • DIAGNOSTIC LAPAROSCOPY N/A 3/12/2018    Procedure: LAPAROSCOPIC RIGHT SALPINGO OOPHORECTOMY BILATERAL SALPINGECTOMY AND LEFT OVARIAN CYSTECTOMY;  Surgeon: Rochelle Pandey MD;  Location: [facility] OR [facility] [place];  Service: Gynecology   • HYSTERECTOMY     • INFERIOR OBLIQUE MYECTOMY     • MITRAL VALVE REPAIR/REPLACEMENT     • SKIN CANCER EXCISION     • URETEROSCOPY LASER LITHOTRIPSY WITH STENT INSERTION         Current Outpatient Medications on File Prior to Visit   Medication Sig Dispense Refill   • aspirin 81 MG tablet Take 81 mg by mouth Daily.     • buPROPion XL (WELLBUTRIN XL) 150 MG 24 hr tablet Take 150 mg by mouth Daily.     • Calcium Carb-Cholecalciferol (CALTRATE 600+D3 SOFT PO) Take 1 mg by mouth Daily.     • Cholecalciferol (Vitamin D3) 50 MCG (2000 UT) capsule Take 2,000 Units by mouth Daily.     • folic acid (FOLVITE) 400 MCG tablet Take 400 mcg by mouth Daily.     • metoprolol succinate XL (TOPROL-XL) 25 MG 24 hr tablet TAKE 1/2 TABLET BY MOUTH EVERY DAY 45 tablet 1   • vitamin C (ASCORBIC ACID) 500 MG tablet Take 500 mg by mouth Daily.     • [DISCONTINUED] eletriptan (RELPAX) 20 MG tablet As Needed.  0     No current facility-administered medications on file prior to visit.        Social History     Socioeconomic History   • Marital status:      Spouse name: Not on file   • Number of children: Not on file   • Years of education: Not on file   • Highest education level: Not on file   Tobacco Use   • Smoking status: Former Smoker     Packs/day: 0.50     Years: 40.00     Pack years: 20.00     Types: Cigarettes     Quit date: 3/30/2020     Years since quittin.5   • Smokeless  "tobacco: Never Used   Substance and Sexual Activity   • Alcohol use: Yes     Alcohol/week: 4.0 standard drinks     Types: 4 Cans of beer per week     Comment: Caffeine use: 2 cups daily/4 beers/month   • Drug use: No     Comment: CAFFEINE USE    • Sexual activity: Not Currently     Partners: Male     Birth control/protection: Post-menopausal           Review of Systems   Constitution: Negative.   HENT: Negative for congestion.    Eyes: Negative for vision loss in left eye and vision loss in right eye.   Respiratory: Negative.  Negative for cough, hemoptysis, shortness of breath, sleep disturbances due to breathing, snoring, sputum production and wheezing.    Endocrine: Negative.    Hematologic/Lymphatic: Negative.    Skin: Negative for poor wound healing and rash.   Musculoskeletal: Negative for falls, gout, muscle cramps and myalgias.   Gastrointestinal: Negative for abdominal pain, diarrhea, dysphagia, hematemesis, melena, nausea and vomiting.   Neurological: Negative for excessive daytime sleepiness, dizziness, headaches, light-headedness, loss of balance, seizures and vertigo.   Psychiatric/Behavioral: Negative for depression and substance abuse. The patient is not nervous/anxious.        Procedures    ECG 12 Lead    Date/Time: 10/7/2020 9:30 AM  Performed by: Jacqui Palencia MD  Authorized by: Jacqui Palencia MD   Comparison: compared with previous ECG   Similar to previous ECG  Rhythm: sinus rhythm  Conduction: left bundle branch block    Clinical impression: abnormal EKG                Objective:      Vitals:    10/07/20 0909   BP: 100/68   BP Location: Left arm   Pulse: 54   Resp: 18   Weight: 67.2 kg (148 lb 3.2 oz)   Height: 162.6 cm (64\")     Body mass index is 25.44 kg/m².    Vitals signs reviewed.   Constitutional:       General: Not in acute distress.     Appearance: Well-developed. Not diaphoretic.   Eyes:      General: No scleral icterus.     Conjunctiva/sclera: Conjunctivae normal. "   HENT:      Head: Normocephalic and atraumatic.   Neck:      Musculoskeletal: Neck supple.      Thyroid: No thyromegaly.      Vascular: No carotid bruit or JVD.      Lymphadenopathy: No cervical adenopathy.   Pulmonary:      Effort: Pulmonary effort is normal. No respiratory distress.      Breath sounds: Normal breath sounds. No wheezing. No rhonchi. No rales.   Chest:      Chest wall: Not tender to palpatation.   Cardiovascular:      Normal rate. Regular rhythm.      No gallop.   Pulses:     Intact distal pulses.   Edema:     Peripheral edema absent.   Abdominal:      General: Bowel sounds are normal. There is no distension or abdominal bruit.      Palpations: Abdomen is soft. There is no abdominal mass.      Tenderness: There is no abdominal tenderness.   Musculoskeletal:         General: No deformity.      Extremities: No clubbing present.  Skin:     General: Skin is warm and dry. There is no cyanosis.      Coloration: Skin is not pale.      Findings: No rash.   Neurological:      Mental Status: Alert and oriented to person, place, and time.      Cranial Nerves: No cranial nerve deficit.   Psychiatric:         Judgment: Judgment normal.         Lab Review:       Assessment:      Diagnosis Plan   1. Cardiomyopathy, hypertrophic, primary familial (CMS/HCC)     2. Diastolic dysfunction     3. Nonrheumatic mitral valve regurgitation     4. Elevated cholesterol     5. Tobacco use       1. Hypertrophic cardiomyopathy, s/p Septal myectomy with plication of A2 of the mitral leaflet and resection of the accessory papillary muscle head to A3 of the mitral valve done 12/29/2010 at [facility].  Has no LVOT obstruction.   2. Mild to moderate mitral insufficiency, stable.   3. Tobacco use, advised cessation.  4. Hyperlipidemia, target LDL <100.  May need statin.  She is trying to exercise, await next labs.      Plan:       See camacho in 1 year, no med changes.

## 2020-10-08 ENCOUNTER — TELEPHONE (OUTPATIENT)
Dept: CARDIOLOGY | Facility: CLINIC | Age: 60
End: 2020-10-08

## 2020-10-08 DIAGNOSIS — E78.00 ELEVATED CHOLESTEROL: ICD-10-CM

## 2020-10-08 DIAGNOSIS — I42.2 CARDIOMYOPATHY, HYPERTROPHIC, PRIMARY FAMILIAL (HCC): Primary | ICD-10-CM

## 2020-10-08 DIAGNOSIS — I34.0 NONRHEUMATIC MITRAL VALVE REGURGITATION: ICD-10-CM

## 2020-10-08 LAB
AORTIC ARCH: 2.5 CM
ASCENDING AORTA: 3.1 CM
BH CV ECHO MEAS - ACS: 2.1 CM
BH CV ECHO MEAS - AO ARCH DIAM (PROXIMAL TRANS.): 2.5 CM
BH CV ECHO MEAS - AO MAX PG (FULL): 6.4 MMHG
BH CV ECHO MEAS - AO MAX PG: 13.1 MMHG
BH CV ECHO MEAS - AO MEAN PG (FULL): 2.8 MMHG
BH CV ECHO MEAS - AO MEAN PG: 6.1 MMHG
BH CV ECHO MEAS - AO ROOT AREA (BSA CORRECTED): 1.7
BH CV ECHO MEAS - AO ROOT AREA: 6.7 CM^2
BH CV ECHO MEAS - AO ROOT DIAM: 2.9 CM
BH CV ECHO MEAS - AO V2 MAX: 181 CM/SEC
BH CV ECHO MEAS - AO V2 MEAN: 115.8 CM/SEC
BH CV ECHO MEAS - AO V2 VTI: 35.2 CM
BH CV ECHO MEAS - ASC AORTA: 3.1 CM
BH CV ECHO MEAS - AVA(I,A): 1.9 CM^2
BH CV ECHO MEAS - AVA(I,D): 1.9 CM^2
BH CV ECHO MEAS - AVA(V,A): 1.8 CM^2
BH CV ECHO MEAS - AVA(V,D): 1.8 CM^2
BH CV ECHO MEAS - BSA(HAYCOCK): 1.7 M^2
BH CV ECHO MEAS - BSA: 1.7 M^2
BH CV ECHO MEAS - BZI_BMI: 25.2 KILOGRAMS/M^2
BH CV ECHO MEAS - BZI_METRIC_HEIGHT: 162.6 CM
BH CV ECHO MEAS - BZI_METRIC_WEIGHT: 66.7 KG
BH CV ECHO MEAS - EDV(MOD-SP2): 46 ML
BH CV ECHO MEAS - EDV(MOD-SP4): 58 ML
BH CV ECHO MEAS - EDV(TEICH): 145.3 ML
BH CV ECHO MEAS - EF(CUBED): 85.3 %
BH CV ECHO MEAS - EF(MOD-BP): 60.4 %
BH CV ECHO MEAS - EF(MOD-SP2): 56.5 %
BH CV ECHO MEAS - EF(MOD-SP4): 65.5 %
BH CV ECHO MEAS - EF(TEICH): 78.2 %
BH CV ECHO MEAS - ESV(MOD-SP2): 20 ML
BH CV ECHO MEAS - ESV(MOD-SP4): 20 ML
BH CV ECHO MEAS - ESV(TEICH): 31.7 ML
BH CV ECHO MEAS - FS: 47.3 %
BH CV ECHO MEAS - IVS/LVPW: 1.4
BH CV ECHO MEAS - IVSD: 1.5 CM
BH CV ECHO MEAS - LAT PEAK E' VEL: 8.4 CM/SEC
BH CV ECHO MEAS - LV DIASTOLIC VOL/BSA (35-75): 33.8 ML/M^2
BH CV ECHO MEAS - LV MASS(C)D: 283.7 GRAMS
BH CV ECHO MEAS - LV MASS(C)DI: 165.3 GRAMS/M^2
BH CV ECHO MEAS - LV MAX PG: 6.7 MMHG
BH CV ECHO MEAS - LV MEAN PG: 3.3 MMHG
BH CV ECHO MEAS - LV SYSTOLIC VOL/BSA (12-30): 11.7 ML/M^2
BH CV ECHO MEAS - LV V1 MAX: 129.4 CM/SEC
BH CV ECHO MEAS - LV V1 MEAN: 85.2 CM/SEC
BH CV ECHO MEAS - LV V1 VTI: 27.4 CM
BH CV ECHO MEAS - LVIDD: 5.5 CM
BH CV ECHO MEAS - LVIDS: 2.9 CM
BH CV ECHO MEAS - LVLD AP2: 7.7 CM
BH CV ECHO MEAS - LVLD AP4: 8.1 CM
BH CV ECHO MEAS - LVLS AP2: 7.2 CM
BH CV ECHO MEAS - LVLS AP4: 6.6 CM
BH CV ECHO MEAS - LVOT AREA (M): 2.5 CM^2
BH CV ECHO MEAS - LVOT AREA: 2.5 CM^2
BH CV ECHO MEAS - LVOT DIAM: 1.8 CM
BH CV ECHO MEAS - LVPWD: 1 CM
BH CV ECHO MEAS - MED PEAK E' VEL: 4.9 CM/SEC
BH CV ECHO MEAS - MR MAX PG: 86.1 MMHG
BH CV ECHO MEAS - MR MAX VEL: 464 CM/SEC
BH CV ECHO MEAS - MV A DUR: 0.12 SEC
BH CV ECHO MEAS - MV A MAX VEL: 63 CM/SEC
BH CV ECHO MEAS - MV DEC SLOPE: 627.4 CM/SEC^2
BH CV ECHO MEAS - MV DEC TIME: 0.2 SEC
BH CV ECHO MEAS - MV E MAX VEL: 115.3 CM/SEC
BH CV ECHO MEAS - MV E/A: 1.8
BH CV ECHO MEAS - MV MAX PG: 5.1 MMHG
BH CV ECHO MEAS - MV MEAN PG: 1.8 MMHG
BH CV ECHO MEAS - MV P1/2T MAX VEL: 116.1 CM/SEC
BH CV ECHO MEAS - MV P1/2T: 54.2 MSEC
BH CV ECHO MEAS - MV V2 MAX: 113.1 CM/SEC
BH CV ECHO MEAS - MV V2 MEAN: 61.9 CM/SEC
BH CV ECHO MEAS - MV V2 VTI: 34 CM
BH CV ECHO MEAS - MVA P1/2T LCG: 1.9 CM^2
BH CV ECHO MEAS - MVA(P1/2T): 4.1 CM^2
BH CV ECHO MEAS - MVA(VTI): 2 CM^2
BH CV ECHO MEAS - PA ACC TIME: 0.13 SEC
BH CV ECHO MEAS - PA MAX PG (FULL): 2.5 MMHG
BH CV ECHO MEAS - PA MAX PG: 4.8 MMHG
BH CV ECHO MEAS - PA PR(ACCEL): 19.6 MMHG
BH CV ECHO MEAS - PA V2 MAX: 109.6 CM/SEC
BH CV ECHO MEAS - PULM A REVS DUR: 0.15 SEC
BH CV ECHO MEAS - PULM A REVS VEL: 33.8 CM/SEC
BH CV ECHO MEAS - PULM DIAS VEL: 43.3 CM/SEC
BH CV ECHO MEAS - PULM S/D: 1.4
BH CV ECHO MEAS - PULM SYS VEL: 61.3 CM/SEC
BH CV ECHO MEAS - PVA(V,A): 1.5 CM^2
BH CV ECHO MEAS - PVA(V,D): 1.5 CM^2
BH CV ECHO MEAS - QP/QS: 0.48
BH CV ECHO MEAS - RV MAX PG: 2.3 MMHG
BH CV ECHO MEAS - RV MEAN PG: 1.4 MMHG
BH CV ECHO MEAS - RV V1 MAX: 75.4 CM/SEC
BH CV ECHO MEAS - RV V1 MEAN: 55.9 CM/SEC
BH CV ECHO MEAS - RV V1 VTI: 15.3 CM
BH CV ECHO MEAS - RVOT AREA: 2.1 CM^2
BH CV ECHO MEAS - RVOT DIAM: 1.7 CM
BH CV ECHO MEAS - SI(AO): 137.8 ML/M^2
BH CV ECHO MEAS - SI(CUBED): 81.2 ML/M^2
BH CV ECHO MEAS - SI(LVOT): 39.9 ML/M^2
BH CV ECHO MEAS - SI(MOD-SP2): 15.2 ML/M^2
BH CV ECHO MEAS - SI(MOD-SP4): 22.1 ML/M^2
BH CV ECHO MEAS - SI(TEICH): 66.2 ML/M^2
BH CV ECHO MEAS - SUP REN AO DIAM: 1.7 CM
BH CV ECHO MEAS - SV(AO): 236.5 ML
BH CV ECHO MEAS - SV(CUBED): 139.4 ML
BH CV ECHO MEAS - SV(LVOT): 68.5 ML
BH CV ECHO MEAS - SV(MOD-SP2): 26 ML
BH CV ECHO MEAS - SV(MOD-SP4): 38 ML
BH CV ECHO MEAS - SV(RVOT): 32.8 ML
BH CV ECHO MEAS - SV(TEICH): 113.6 ML
BH CV ECHO MEASUREMENTS AVERAGE E/E' RATIO: 17.34
BH CV XLRA - RV BASE: 3.6 CM
BH CV XLRA - RV LENGTH: 6.8 CM
BH CV XLRA - RV MID: 3 CM
BH CV XLRA - TDI S': 10.5 CM/SEC
LEFT ATRIUM VOLUME INDEX: 48 ML/M2
MAXIMAL PREDICTED HEART RATE: 159 BPM
SINUS: 3.2 CM
STJ: 2.8 CM
STRESS TARGET HR: 135 BPM

## 2020-10-08 NOTE — TELEPHONE ENCOUNTER
----- Message from Jacqui Palencia MD sent at 10/8/2020 12:13 PM EDT -----  Regarding: RE: Lab Order  Contact: [phone]  The orders are in.      rm  ----- Message -----  From: Shereen Joseph RegSched Rep  Sent: 10/8/2020   8:45 AM EDT  To: Jacqui Palencia MD  Subject: Lab Order                                        Dr. Palencia,   I received a phone call from the patient. She states she needs an order placed so she can have her labs completed at her PCP (which is listed in her chart as- Neva Cohen, GERMAINE) . Please advise. Thanks,  De

## 2020-10-14 ENCOUNTER — TELEPHONE (OUTPATIENT)
Dept: CARDIOLOGY | Facility: CLINIC | Age: 60
End: 2020-10-14

## 2020-10-14 DIAGNOSIS — E78.00 ELEVATED CHOLESTEROL: Primary | ICD-10-CM

## 2020-10-14 RX ORDER — ATORVASTATIN CALCIUM 20 MG/1
20 TABLET, FILM COATED ORAL NIGHTLY
Qty: 90 TABLET | Refills: 3 | Status: SHIPPED | OUTPATIENT
Start: 2020-10-14 | End: 2021-10-19

## 2020-10-14 NOTE — TELEPHONE ENCOUNTER
Called pt - labs/lipids from 10/12/20.  Her , triglycerides 177, total cholesterol 262, HDL 56, non-, glucose 58, otherwise normal CMP, normal CBC.    Starting atorvastatin 20 mg nightly.

## 2021-03-04 DIAGNOSIS — E78.00 ELEVATED CHOLESTEROL: Primary | ICD-10-CM

## 2021-03-16 ENCOUNTER — BULK ORDERING (OUTPATIENT)
Dept: CASE MANAGEMENT | Facility: OTHER | Age: 61
End: 2021-03-16

## 2021-03-16 DIAGNOSIS — Z23 IMMUNIZATION DUE: ICD-10-CM

## 2021-05-10 RX ORDER — METOPROLOL SUCCINATE 25 MG/1
TABLET, EXTENDED RELEASE ORAL
Qty: 45 TABLET | Refills: 1 | Status: SHIPPED | OUTPATIENT
Start: 2021-05-10 | End: 2021-11-12

## 2021-10-05 ENCOUNTER — OFFICE VISIT (OUTPATIENT)
Dept: CARDIOLOGY | Facility: CLINIC | Age: 61
End: 2021-10-05

## 2021-10-05 VITALS
HEART RATE: 69 BPM | RESPIRATION RATE: 16 BRPM | OXYGEN SATURATION: 99 % | WEIGHT: 133 LBS | DIASTOLIC BLOOD PRESSURE: 78 MMHG | BODY MASS INDEX: 22.71 KG/M2 | SYSTOLIC BLOOD PRESSURE: 118 MMHG | HEIGHT: 64 IN

## 2021-10-05 DIAGNOSIS — I42.2 CARDIOMYOPATHY, HYPERTROPHIC, PRIMARY FAMILIAL (HCC): Primary | ICD-10-CM

## 2021-10-05 DIAGNOSIS — I51.89 DIASTOLIC DYSFUNCTION: ICD-10-CM

## 2021-10-05 DIAGNOSIS — E78.00 ELEVATED CHOLESTEROL: ICD-10-CM

## 2021-10-05 DIAGNOSIS — I34.0 NONRHEUMATIC MITRAL VALVE REGURGITATION: ICD-10-CM

## 2021-10-05 PROCEDURE — 99214 OFFICE O/P EST MOD 30 MIN: CPT | Performed by: NURSE PRACTITIONER

## 2021-10-05 NOTE — PROGRESS NOTES
Date of Office Visit: 10/05/2021  Encounter Provider: GERMAINE Salgado  Place of Service: [facility] CARDIOLOGY  Patient Name: Shira Cortes  :1960      Patient ID:  Shira Cortes is a 61 y.o. female is here for followup for hypertrophic cardiomyopathy, nonrheumatic mitral valve, and elevated cholesterol.  She is a new patient to me and I reviewed her records in preparation for this visit.      History of Present Illness    She was first evaluated on 2010 secondary to complaints of dizziness, and near syncope with exertion. Her echocardiogram showed marked septal hypertrophy with systolic anterior movement of the anterior mitral leaflet and severe left ventricular outflow tract obstruction with a peak gradient of 72 mmHg. There was grade II diastolic dysfunction and a hyperdynamic small size left ventricle. There was also mild mitral insufficiency. She had a cardiac catheterization performed on 2010 which showed normal coronaries and hypertrophic myopathy. She then had a Holter monitor recording performed which showed premature ventricular complexes. She also had a stress echocardiogram performed which showed hypotension with exercise and severe systolic anterior movement of the mitral leaflet at rest with exercise and with exercise. The left ventricular outflow tract gradient increased with exercise as well as the number of premature ventricular complexes. She also had nonsustained ventricular tachycardia with exercise.     She was referred to [facility] for a septal myectomy which she has had performed on 2010. She had septal myectomy with plication of A2 of mitral valve leaflet and resection of the accessory papillary muscle head to the A3 segment of the mitral valve performed by Dr. Anselmo Diop at the [facility]. She had a cardiac MRI, which confirmed hypertrophic cardiomyopathy. Her postoperative echocardiogram showed an ejection  fraction of 65%, no systolic anterior movement of the mitral leaflet and 1+ mitral insufficiency. Her resting left ventricular outflow tract gradient was 26 mmHg and there was no increase in her mitral insufficiency and left ventricular outflow tract gradient after amyl nitrite.      She had an echocardiogram done 12/5/17 showing ejection fraction of 55% with mild mitral insufficiency status post surgical mitral valve repair with restricted movement of the posterior leaflet.  There was noted systolic anterior motion of the coral apparatus but no significant LVOT obstruction is seen.  The LVOT gradient with Valsalva was 11 mmHg and 9 mmHg with rest.    An echocardiogram done 10/10/2020 that revealed moderate concentric left ventricular hypertrophy with no evidence of left ventricular outflow tract gradient, and apical hypertrophy noted.  Her ejection fraction was 51 to 55% with severe left atrial enlargement, septal hypokinesis, mild to moderate mitral insufficiency with a prior mitral repair.  Overall it looked pretty stable.      She saw Dr. Palencia 10/7/2020 in the office.  She was doing well, but was smoking 9 cigarettes a day.  She was exercising 20 minutes every other day on elliptical and bike.  She was doing physical therapy.  She does not have any significant cardiac symptoms.    Labs 3/19/2021 reveal a total cholesterol of 133, HDL 50, triglycerides 100, LDL 64.  Labs 2/7/2020 reveal a potassium of 5.5, otherwise normal CMP, normal CBC, normal TSH, and hemoglobin A1c 5.4.    She is here for 1 year follow-up.  She has been doing quite well she recently got a puppy and has been walking every day with it.  She was not had any palpitations, but after a steroid shot in her knee last week she felt that her heart was beating a little weird she noticed about 3 or 4 days of intermittent flutters that were not sustained.  She recognized it as her previous palpitations.  She is not had any shortness of breath, lower  "extremity edema, dizziness lightheadedness syncope, near syncope, chest pain or pressure, or fatigue.  She has stopped her Jazzercise due to pain in her joints.  She has been a little anxious reportedly.  Her brother was \"diagnosed with the same disease just in a different way\" he was just diagnosed with congestive heart failure and this worries her quite a lot.  Her blood pressure is well controlled.  She is still smoking about a half a pack to a pack of cigarettes a day.  We did talk about stopping.  She has previously tried Wellbutrin, Chantix, and had originally stopped with Xanax to get through the first month.  Would like to try this again.  Have encouraged her to find a primary care provider as she is currently seeing [facility] physicians at .  We talked in length about smoking cessation.  She wants to quit, but does have such a hard time getting over the mental portion of the addiction.  It makes her very anxious to even think about it.      Past Medical History:   Diagnosis Date   • Abnormal ECG 2004   • Basal cell carcinoma 2015    RIGHT CLAVICLE   • Congenital heart disease 2009    HCM runs in family mother, brother   • Diastolic dysfunction    • Frequent headaches    • Heart murmur 2009   • History of kidney stones    • Hypertrophic cardiomyopathy (HCC)    • Mass of right ovary    • Migraines    • Mitral valve insufficiency    • Mitral valve prolapse 2009    had open heart 2009   • Pelvic pain    • PONV (postoperative nausea and vomiting)    • PVC (premature ventricular contraction)    • Ventricular tachyarrhythmia (HCC)          Past Surgical History:   Procedure Laterality Date   • CARDIAC CATHETERIZATION  2009    pre op   • DIAGNOSTIC LAPAROSCOPY N/A 3/12/2018    Procedure: LAPAROSCOPIC RIGHT SALPINGO OOPHORECTOMY BILATERAL SALPINGECTOMY AND LEFT OVARIAN CYSTECTOMY;  Surgeon: Rochelle Pandey MD;  Location: [facility] OR [facility] [place];  Service: Gynecology   • HYSTERECTOMY     • INFERIOR OBLIQUE MYECTOMY     • " MITRAL VALVE REPAIR/REPLACEMENT     • SKIN CANCER EXCISION     • URETEROSCOPY LASER LITHOTRIPSY WITH STENT INSERTION         Current Outpatient Medications on File Prior to Visit   Medication Sig Dispense Refill   • aspirin 81 MG tablet Take 81 mg by mouth Daily.     • atorvastatin (LIPITOR) 20 MG tablet Take 1 tablet by mouth Every Night. 90 tablet 3   • Calcium Carb-Cholecalciferol (CALTRATE 600+D3 SOFT PO) Take 1 mg by mouth Daily.     • folic acid (FOLVITE) 400 MCG tablet Take 400 mcg by mouth Daily.     • metoprolol succinate XL (TOPROL-XL) 25 MG 24 hr tablet TAKE 1/2 TABLET BY MOUTH EVERY DAY 45 tablet 1   • vitamin C (ASCORBIC ACID) 500 MG tablet Take 500 mg by mouth Daily.     • buPROPion XL (WELLBUTRIN XL) 150 MG 24 hr tablet Take 150 mg by mouth Daily.     • Cholecalciferol (Vitamin D3) 50 MCG (2000 UT) capsule Take 2,000 Units by mouth Daily.       No current facility-administered medications on file prior to visit.       Social History     Socioeconomic History   • Marital status:      Spouse name: Not on file   • Number of children: Not on file   • Years of education: Not on file   • Highest education level: Not on file   Tobacco Use   • Smoking status: Former Smoker     Packs/day: 0.50     Years: 40.00     Pack years: 20.00     Types: Cigarettes     Quit date: 3/30/2020     Years since quittin.5   • Smokeless tobacco: Never Used   Vaping Use   • Vaping Use: Never used   Substance and Sexual Activity   • Alcohol use: Yes     Alcohol/week: 4.0 standard drinks     Types: 4 Cans of beer per week     Comment: Caffeine use: 2 cups daily/4 beers/month   • Drug use: No     Comment: CAFFEINE USE    • Sexual activity: Not Currently     Partners: Male     Birth control/protection: Post-menopausal           Review of Systems   Constitutional: Negative for chills, decreased appetite, diaphoresis, fever and malaise/fatigue.   HENT: Negative for nosebleeds.    Eyes: Negative for blurred vision.  "  Cardiovascular: Negative for chest pain, claudication, cyanosis, dyspnea on exertion, irregular heartbeat, leg swelling, near-syncope, orthopnea, palpitations, paroxysmal nocturnal dyspnea and syncope.   Respiratory: Negative for cough, hemoptysis, shortness of breath, sleep disturbances due to breathing, snoring and wheezing.    Hematologic/Lymphatic: Negative for bleeding problem. Does not bruise/bleed easily.   Musculoskeletal: Negative for falls.   Gastrointestinal: Negative for heartburn.   Neurological: Negative for excessive daytime sleepiness, dizziness, headaches, light-headedness, numbness and weakness.       Procedures  Procedures        Objective:      Vitals:    10/05/21 1057   BP: 118/78   BP Location: Left arm   Patient Position: Lying   Cuff Size: Adult   Pulse: 69   Resp: 16   SpO2: 99%   Weight: 60.3 kg (133 lb)   Height: 162.6 cm (64\")     Body mass index is 22.83 kg/m².    Vitals reviewed.   Constitutional:       Appearance: Healthy appearance. Not in distress.   Eyes:      Conjunctiva/sclera: Conjunctivae normal.   Neck:      Vascular: No carotid bruit. JVD normal.   Pulmonary:      Effort: Pulmonary effort is normal.      Breath sounds: Normal breath sounds.   Cardiovascular:      PMI at left midclavicular line. Normal rate. Regular rhythm.      Murmurs: There is no murmur.   Pulses:     Intact distal pulses.   Edema:     Peripheral edema absent.   Abdominal:      Palpations: Abdomen is soft.      Tenderness: There is no abdominal tenderness.   Skin:     General: Skin is warm and dry.   Neurological:      Mental Status: Alert and oriented to person, place and time.   Psychiatric:         Attention and Perception: Attention and perception normal.         Lab Review:       Assessment:      Diagnosis Plan   1. Cardiomyopathy, hypertrophic, primary familial (HCC)     2. Diastolic dysfunction     3. Elevated cholesterol     4. Nonrheumatic mitral valve regurgitation       1. Hypertrophic " cardiomyopathy status post septal myomectomy with plication of A2 of the mitral leaflet and resection of the accessory papillary muscle head to a 3 of the mitral valve done 12/29/2010 at [facility].  She has no LVOT obstruction.  2. Mild to moderate mitral insufficiency, stable.  3. Tobacco use, advised cessation.  4. Hyperlipidemia target LDL less than 100, well controlled on atorvastatin 20 mg daily.     Plan:       Overall she seems to be quite stable from a cardiac standpoint.  We talked about smoking cessation.  I encouraged her to try to get 30 minutes of cardiovascular exercise a day.  I will have her follow-up with Dr. Palencia in a year.

## 2021-10-19 RX ORDER — ATORVASTATIN CALCIUM 20 MG/1
TABLET, FILM COATED ORAL
Qty: 90 TABLET | Refills: 3 | Status: SHIPPED | OUTPATIENT
Start: 2021-10-19 | End: 2022-11-08

## 2021-11-05 ENCOUNTER — TELEPHONE (OUTPATIENT)
Dept: CARDIOLOGY | Facility: CLINIC | Age: 61
End: 2021-11-05

## 2021-11-05 NOTE — TELEPHONE ENCOUNTER
Received a fax from Alida Moreland needing cardiac clearance for pt to have a colonoscopy     This has not yet been scheduled     Pt was last seen on 10/5/2021 by Merlyn HERRON    I will bring the form with me to Omar

## 2021-11-12 RX ORDER — METOPROLOL SUCCINATE 25 MG/1
TABLET, EXTENDED RELEASE ORAL
Qty: 45 TABLET | Refills: 1 | Status: SHIPPED | OUTPATIENT
Start: 2021-11-12 | End: 2022-05-19

## 2021-12-08 VITALS
DIASTOLIC BLOOD PRESSURE: 53 MMHG | OXYGEN SATURATION: 95 % | DIASTOLIC BLOOD PRESSURE: 52 MMHG | DIASTOLIC BLOOD PRESSURE: 44 MMHG | HEART RATE: 69 BPM | RESPIRATION RATE: 21 BRPM | HEART RATE: 76 BPM | HEART RATE: 78 BPM | SYSTOLIC BLOOD PRESSURE: 102 MMHG | RESPIRATION RATE: 6 BRPM | SYSTOLIC BLOOD PRESSURE: 95 MMHG | SYSTOLIC BLOOD PRESSURE: 91 MMHG | SYSTOLIC BLOOD PRESSURE: 100 MMHG | OXYGEN SATURATION: 96 % | RESPIRATION RATE: 12 BRPM | OXYGEN SATURATION: 99 % | HEART RATE: 79 BPM | OXYGEN SATURATION: 98 % | RESPIRATION RATE: 18 BRPM | RESPIRATION RATE: 16 BRPM | OXYGEN SATURATION: 97 % | HEART RATE: 77 BPM | RESPIRATION RATE: 13 BRPM | RESPIRATION RATE: 24 BRPM | DIASTOLIC BLOOD PRESSURE: 60 MMHG | DIASTOLIC BLOOD PRESSURE: 55 MMHG | SYSTOLIC BLOOD PRESSURE: 115 MMHG | HEART RATE: 80 BPM | DIASTOLIC BLOOD PRESSURE: 58 MMHG | DIASTOLIC BLOOD PRESSURE: 56 MMHG | SYSTOLIC BLOOD PRESSURE: 118 MMHG | SYSTOLIC BLOOD PRESSURE: 92 MMHG | WEIGHT: 135 LBS | TEMPERATURE: 97.3 F | OXYGEN SATURATION: 94 % | HEART RATE: 71 BPM | DIASTOLIC BLOOD PRESSURE: 67 MMHG | SYSTOLIC BLOOD PRESSURE: 89 MMHG | RESPIRATION RATE: 22 BRPM | OXYGEN SATURATION: 93 % | TEMPERATURE: 97 F | RESPIRATION RATE: 19 BRPM | RESPIRATION RATE: 10 BRPM | SYSTOLIC BLOOD PRESSURE: 103 MMHG | HEART RATE: 75 BPM | SYSTOLIC BLOOD PRESSURE: 111 MMHG | HEIGHT: 64 IN | DIASTOLIC BLOOD PRESSURE: 59 MMHG | DIASTOLIC BLOOD PRESSURE: 70 MMHG

## 2021-12-09 ENCOUNTER — OFFICE (AMBULATORY)
Dept: URBAN - METROPOLITAN AREA PATHOLOGY 4 | Facility: PATHOLOGY | Age: 61
End: 2021-12-09

## 2021-12-09 DIAGNOSIS — D12.0 BENIGN NEOPLASM OF CECUM: ICD-10-CM

## 2021-12-09 PROCEDURE — 88305 TISSUE EXAM BY PATHOLOGIST: CPT | Performed by: INTERNAL MEDICINE

## 2021-12-10 ENCOUNTER — AMBULATORY SURGICAL CENTER (AMBULATORY)
Dept: URBAN - METROPOLITAN AREA SURGERY 17 | Facility: SURGERY | Age: 61
End: 2021-12-10
Payer: COMMERCIAL

## 2021-12-10 DIAGNOSIS — Z83.71 FAMILY HISTORY OF COLONIC POLYPS: ICD-10-CM

## 2021-12-10 DIAGNOSIS — K63.5 POLYP OF COLON: ICD-10-CM

## 2021-12-10 LAB
GI HISTOLOGY: A. ILEOCECAL VALVE: (no result)
GI HISTOLOGY: PDF REPORT: (no result)

## 2021-12-10 PROCEDURE — 45385 COLONOSCOPY W/LESION REMOVAL: CPT | Mod: 33 | Performed by: INTERNAL MEDICINE

## 2022-02-25 ENCOUNTER — APPOINTMENT (OUTPATIENT)
Dept: WOMENS IMAGING | Facility: HOSPITAL | Age: 62
End: 2022-02-25

## 2022-02-25 PROCEDURE — 77067 SCR MAMMO BI INCL CAD: CPT | Performed by: RADIOLOGY

## 2022-02-25 PROCEDURE — 77063 BREAST TOMOSYNTHESIS BI: CPT | Performed by: RADIOLOGY

## 2022-05-19 RX ORDER — METOPROLOL SUCCINATE 25 MG/1
TABLET, EXTENDED RELEASE ORAL
Qty: 45 TABLET | Refills: 1 | Status: SHIPPED | OUTPATIENT
Start: 2022-05-19 | End: 2022-11-08

## 2022-10-05 ENCOUNTER — OFFICE VISIT (OUTPATIENT)
Dept: CARDIOLOGY | Facility: CLINIC | Age: 62
End: 2022-10-05

## 2022-10-05 VITALS
WEIGHT: 136 LBS | DIASTOLIC BLOOD PRESSURE: 60 MMHG | HEIGHT: 64 IN | SYSTOLIC BLOOD PRESSURE: 100 MMHG | HEART RATE: 59 BPM | BODY MASS INDEX: 23.22 KG/M2

## 2022-10-05 DIAGNOSIS — Z98.890 S/P MVR (MITRAL VALVE REPAIR): ICD-10-CM

## 2022-10-05 DIAGNOSIS — E78.00 ELEVATED CHOLESTEROL: ICD-10-CM

## 2022-10-05 DIAGNOSIS — I34.0 NONRHEUMATIC MITRAL VALVE REGURGITATION: ICD-10-CM

## 2022-10-05 DIAGNOSIS — Z72.0 TOBACCO USE: ICD-10-CM

## 2022-10-05 DIAGNOSIS — I42.2 CARDIOMYOPATHY, HYPERTROPHIC, PRIMARY FAMILIAL: Primary | ICD-10-CM

## 2022-10-05 DIAGNOSIS — I51.89 DIASTOLIC DYSFUNCTION: ICD-10-CM

## 2022-10-05 PROCEDURE — 99214 OFFICE O/P EST MOD 30 MIN: CPT | Performed by: INTERNAL MEDICINE

## 2022-10-05 PROCEDURE — 93000 ELECTROCARDIOGRAM COMPLETE: CPT | Performed by: INTERNAL MEDICINE

## 2022-10-05 NOTE — PROGRESS NOTES
Date of Office Visit: 10/05/2022  Encounter Provider: Jacqui Palencia MD  Place of Service: [facility] CARDIOLOGY  Patient Name: Shira Cortes  :1960      Patient ID:  Shira Cortes is a 62 y.o. female is here for  followup for hypertrophic cardiomyopathy status post septal myectomy        History of Present Illness    She was first evaluated on 2010 secondary to complaints of dizziness, and near  syncope with exertion. Her echocardiogram showed marked septal hypertrophy with systolic  anterior movement of the anterior mitral leaflet and severe left ventricular outflow tract  obstruction with a peak gradient of 72 mmHg. There was grade II diastolic dysfunction and  a hyperdynamic small size left ventricle. There was also mild mitral insufficiency. She  had a cardiac catheterization performed on 2010 which showed normal coronaries and  hypertrophic myopathy. She then had a Holter monitor recording performed which showed  premature ventricular complexes. She also had a stress echocardiogram performed which  showed hypotension with exercise and severe systolic anterior movement of the mitral  leaflet at rest with exercise and with exercise. The left ventricular outflow tract  gradient increased with exercise as well as the number of premature ventricular complexes.  She also had nonsustained ventricular tachycardia with exercise.      She was referred to [facility] for a septal myectomy which she has had performed on  2010. She had septal myectomy with plication of A2 of mitral valve leaflet and  resection of the accessory papillary muscle head to the A3 segment of the mitral valve  performed by Dr. Anselmo Diop at the [facility]. She had a cardiac MRI, which  confirmed hypertrophic cardiomyopathy. Her postoperative echocardiogram showed an  ejection fraction of 65%, no systolic anterior movement of the mitral leaflet and 1+  mitral  insufficiency. Her resting left ventricular outflow tract gradient was 26 mmHg and  there was no increase in her mitral insufficiency and left ventricular outflow tract  gradient after amyl nitrite.        Echo done 10/10/2020 shows moderate concentric left ventricular hypertrophy with no evidence of left ventricular outflow tract gradient and apical hypertrophy noted.  Her ejection fraction was 51-55% with severe left atrial enlargement, septal hypokinesis, mild to moderate mitral insufficiency with a prior mitral repair.  Overall looks very stable.     She has no tachycardia, dizziness, syncope.  She has a dog that she walks 1.5 miles every day.  She is going to try to get back into [employer].  She is smoking half a pack to three quarters of a pack a day.  She has no exertional chest tightness or pressure.  She has no orthopnea or PND.  Her energy level is stable.  She is taking her medications as directed without difficulty.  She has no exertional dyspnea.    Labs on 7/15/2022 show normal CMP, normal CBC, hemoglobin A1c 5.8%, normal vitamin D, total cholesterol 157, triglycerides 77, HDL 52, VLDL 15, LDL 90.    Past Medical History:   Diagnosis Date   • Abnormal ECG 2004   • Basal cell carcinoma 2015    RIGHT CLAVICLE   • Congenital heart disease 2009    HCM runs in family mother, brother   • Diastolic dysfunction    • Frequent headaches    • Heart murmur 2009   • History of kidney stones    • Hypertrophic cardiomyopathy (HCC)    • Mass of right ovary    • Migraines    • Mitral valve insufficiency    • Mitral valve prolapse 2009    had open heart 2009   • Pelvic pain    • PONV (postoperative nausea and vomiting)    • PVC (premature ventricular contraction)    • Ventricular tachyarrhythmia          Past Surgical History:   Procedure Laterality Date   • CARDIAC CATHETERIZATION  2009    pre op   • DIAGNOSTIC LAPAROSCOPY N/A 03/12/2018    Procedure: LAPAROSCOPIC RIGHT SALPINGO OOPHORECTOMY BILATERAL SALPINGECTOMY  AND LEFT OVARIAN CYSTECTOMY;  Surgeon: Rochelle Pandey MD;  [place] [facility] [place] [facility] [place]  Service: Gynecology   • HYSTERECTOMY     • INFERIOR OBLIQUE MYECTOMY     • MITRAL VALVE REPAIR/REPLACEMENT     • SKIN CANCER EXCISION     • URETEROSCOPY LASER LITHOTRIPSY WITH STENT INSERTION         Current Outpatient Medications on File Prior to Visit   Medication Sig Dispense Refill   • aspirin 81 MG tablet Take 81 mg by mouth Daily.     • atorvastatin (LIPITOR) 20 MG tablet TAKE 1 TABLET BY MOUTH EVERY DAY AT NIGHT 90 tablet 3   • metoprolol succinate XL (TOPROL-XL) 25 MG 24 hr tablet TAKE A HALF TABLET BY MOUTH EVERY DAY 45 tablet 1   • vitamin C (ASCORBIC ACID) 500 MG tablet Take 500 mg by mouth Daily.     • [DISCONTINUED] Calcium Carb-Cholecalciferol (CALTRATE 600+D3 SOFT PO) Take 1 mg by mouth Daily.     • [DISCONTINUED] folic acid (FOLVITE) 400 MCG tablet Take 400 mcg by mouth Daily.       No current facility-administered medications on file prior to visit.       Social History     Socioeconomic History   • Marital status:    Tobacco Use   • Smoking status: Current Every Day Smoker     Packs/day: 0.50     Years: 40.00     Pack years: 20.00     Types: Cigarettes     Last attempt to quit: 3/30/2020     Years since quittin.5   • Smokeless tobacco: Never Used   Vaping Use   • Vaping Use: Never used   Substance and Sexual Activity   • Alcohol use: Yes     Alcohol/week: 4.0 standard drinks     Types: 4 Cans of beer per week     Comment: Do not drink daily   • Drug use: No     Comment: CAFFEINE USE    • Sexual activity: Not Currently     Partners: Male     Birth control/protection: Post-menopausal           ROS    Procedures    ECG 12 Lead    Date/Time: 10/5/2022 10:44 AM  Performed by: Jacqui Palencia MD  Authorized by: Jacqui Palencia MD   Comparison: compared with previous ECG   Similar to previous ECG  Rhythm: sinus rhythm  Conduction: left bundle branch block    Clinical impression: abnormal  "EKG  Comments: Limb lead reversal                Objective:      Vitals:    10/05/22 1023   BP: 100/60   Pulse: 59   Weight: 61.7 kg (136 lb)   Height: 162.6 cm (64\")     Body mass index is 23.34 kg/m².    Vitals reviewed.   Constitutional:       General: Not in acute distress.     Appearance: Well-developed. Not diaphoretic.   Eyes:      General: No scleral icterus.     Conjunctiva/sclera: Conjunctivae normal.   HENT:      Head: Normocephalic and atraumatic.   Neck:      Thyroid: No thyromegaly.      Vascular: No carotid bruit or JVD.      Lymphadenopathy: No cervical adenopathy.   Pulmonary:      Effort: Pulmonary effort is normal. No respiratory distress.      Breath sounds: Normal breath sounds. No wheezing. No rhonchi. No rales.   Chest:      Chest wall: Not tender to palpatation.   Cardiovascular:      Normal rate. Regular rhythm.      Murmurs: There is a grade 2/6 mid to late systolic murmur at the URSB and ULSB.      No gallop.   Pulses:     Intact distal pulses.   Edema:     Peripheral edema absent.   Abdominal:      General: Bowel sounds are normal. There is no distension or abdominal bruit.      Palpations: Abdomen is soft. There is no abdominal mass.      Tenderness: There is no abdominal tenderness.   Musculoskeletal:         General: No deformity.      Extremities: No clubbing present.     Cervical back: Neck supple. Skin:     General: Skin is warm and dry. There is no cyanosis.      Coloration: Skin is not pale.      Findings: No rash.   Neurological:      Mental Status: Alert and oriented to person, place, and time.      Cranial Nerves: No cranial nerve deficit.   Psychiatric:         Judgment: Judgment normal.         Lab Review:       Assessment:      Diagnosis Plan   1. Cardiomyopathy, hypertrophic, primary familial (HCC)  ECG 12 Lead    Adult Transthoracic Echo Complete W/ Cont if Necessary Per Protocol   2. Diastolic dysfunction     3. Elevated cholesterol     4. Nonrheumatic mitral valve " regurgitation     5. S/P MVR (mitral valve repair)  ECG 12 Lead   6. Tobacco use       1. Hypertrophic cardiomyopathy, s/p Septal myectomy with plication of A2 of the mitral leaflet and resection of the accessory papillary muscle head to A3 of the mitral valve done 12/29/2010 at [facility].  Has no LVOT obstruction.  On metoprolol and aspirin for this.  2. Mild to moderate mitral insufficiency, stable.   3. Tobacco use, advised cessation.  4. Hyperlipidemia, on atorvastatin.  5. Insomnia, severe       Plan:       Repeat echo next year and see Meme the same day, no medication changes, advised tobacco cessation.  Diet exercise to help with sleep.

## 2022-11-08 RX ORDER — ATORVASTATIN CALCIUM 20 MG/1
TABLET, FILM COATED ORAL
Qty: 90 TABLET | Refills: 3 | Status: SHIPPED | OUTPATIENT
Start: 2022-11-08

## 2022-11-08 RX ORDER — METOPROLOL SUCCINATE 25 MG/1
TABLET, EXTENDED RELEASE ORAL
Qty: 45 TABLET | Refills: 1 | Status: SHIPPED | OUTPATIENT
Start: 2022-11-08

## 2022-11-08 NOTE — TELEPHONE ENCOUNTER
Failed protocol     Next OV-10/05/23-TK  Last OV-10/05/22-RM    Plan:       Repeat echo next year and see Meme the same day, no medication changes, advised tobacco cessation.  Diet exercise to help with sleep.    Requested labs to be faxed.    Kirstin

## 2023-01-01 NOTE — PROGRESS NOTES
Dr. Palencia,   Pt has been scheduled.   Thanks,  De      [de-identified] : FEVER  X 3 DAYS [FreeTextEntry6] : NO FEVER TODAY

## 2023-05-09 RX ORDER — METOPROLOL SUCCINATE 25 MG/1
TABLET, EXTENDED RELEASE ORAL
Qty: 45 TABLET | Refills: 1 | Status: SHIPPED | OUTPATIENT
Start: 2023-05-09

## 2023-08-16 RX ORDER — METOPROLOL SUCCINATE 25 MG/1
12.5 TABLET, EXTENDED RELEASE ORAL DAILY
Qty: 45 TABLET | Refills: 3 | Status: SHIPPED | OUTPATIENT
Start: 2023-08-16 | End: 2023-08-21 | Stop reason: SDUPTHER

## 2023-08-16 RX ORDER — ATORVASTATIN CALCIUM 20 MG/1
20 TABLET, FILM COATED ORAL
Qty: 90 TABLET | Refills: 3 | Status: SHIPPED | OUTPATIENT
Start: 2023-08-16 | End: 2023-08-21 | Stop reason: SDUPTHER

## 2023-08-16 NOTE — TELEPHONE ENCOUNTER
Patient is requesting a refill on Atorvastatin 20 mg daily to be sent to [facility].    No Protocol    NOV-09/20/23-TK  LOV-10/05/22-RM    Plan:       Repeat echo next year and see Meme the same day, no medication changes, advised tobacco cessation.  Diet exercise to help with sleep.

## 2023-08-21 RX ORDER — ATORVASTATIN CALCIUM 20 MG/1
20 TABLET, FILM COATED ORAL
Qty: 90 TABLET | Refills: 3 | Status: SHIPPED | OUTPATIENT
Start: 2023-08-21

## 2023-08-21 RX ORDER — METOPROLOL SUCCINATE 25 MG/1
12.5 TABLET, EXTENDED RELEASE ORAL DAILY
Qty: 45 TABLET | Refills: 3 | Status: SHIPPED | OUTPATIENT
Start: 2023-08-21

## 2023-09-20 ENCOUNTER — HOSPITAL ENCOUNTER (OUTPATIENT)
Dept: CARDIOLOGY | Facility: HOSPITAL | Age: 63
Discharge: HOME | End: 2023-09-20
Admitting: INTERNAL MEDICINE
Payer: COMMERCIAL

## 2023-09-20 ENCOUNTER — OFFICE VISIT (OUTPATIENT)
Dept: CARDIOLOGY | Facility: CLINIC | Age: 63
End: 2023-09-20
Payer: COMMERCIAL

## 2023-09-20 VITALS
HEIGHT: 64 IN | HEART RATE: 57 BPM | WEIGHT: 138 LBS | DIASTOLIC BLOOD PRESSURE: 68 MMHG | BODY MASS INDEX: 23.56 KG/M2 | SYSTOLIC BLOOD PRESSURE: 105 MMHG

## 2023-09-20 VITALS — OXYGEN SATURATION: 98 % | SYSTOLIC BLOOD PRESSURE: 110 MMHG | HEART RATE: 72 BPM | DIASTOLIC BLOOD PRESSURE: 60 MMHG

## 2023-09-20 DIAGNOSIS — I34.0 NONRHEUMATIC MITRAL VALVE REGURGITATION: ICD-10-CM

## 2023-09-20 DIAGNOSIS — I42.2 CARDIOMYOPATHY, HYPERTROPHIC, PRIMARY FAMILIAL: Primary | ICD-10-CM

## 2023-09-20 DIAGNOSIS — I42.2 CARDIOMYOPATHY, HYPERTROPHIC, PRIMARY FAMILIAL: ICD-10-CM

## 2023-09-20 DIAGNOSIS — I44.7 LBBB (LEFT BUNDLE BRANCH BLOCK): ICD-10-CM

## 2023-09-20 DIAGNOSIS — E78.2 MIXED HYPERLIPIDEMIA: ICD-10-CM

## 2023-09-20 DIAGNOSIS — I51.89 DIASTOLIC DYSFUNCTION: ICD-10-CM

## 2023-09-20 DIAGNOSIS — Z72.0 TOBACCO USE: ICD-10-CM

## 2023-09-20 DIAGNOSIS — Z98.890 S/P MVR (MITRAL VALVE REPAIR): ICD-10-CM

## 2023-09-20 LAB
AORTIC ARCH: 2.5 CM
ASCENDING AORTA: 3 CM
BH CV ECHO LEFT VENTRICLE GLOBAL LONGITUDINAL STRAIN: -13.1 %
BH CV ECHO MEAS - ACS: 2.03 CM
BH CV ECHO MEAS - AO MAX PG: 9.4 MMHG
BH CV ECHO MEAS - AO MEAN PG: 5 MMHG
BH CV ECHO MEAS - AO ROOT DIAM: 3.2 CM
BH CV ECHO MEAS - AO V2 MAX: 153 CM/SEC
BH CV ECHO MEAS - AO V2 VTI: 33.9 CM
BH CV ECHO MEAS - AVA(I,D): 3.1 CM2
BH CV ECHO MEAS - EDV(CUBED): 132.7 ML
BH CV ECHO MEAS - EDV(MOD-SP2): 69 ML
BH CV ECHO MEAS - EDV(MOD-SP4): 122 ML
BH CV ECHO MEAS - EF(MOD-BP): 64.4 %
BH CV ECHO MEAS - EF(MOD-SP2): 59.4 %
BH CV ECHO MEAS - EF(MOD-SP4): 68.9 %
BH CV ECHO MEAS - ESV(CUBED): 23 ML
BH CV ECHO MEAS - ESV(MOD-SP2): 28 ML
BH CV ECHO MEAS - ESV(MOD-SP4): 38 ML
BH CV ECHO MEAS - FS: 44.3 %
BH CV ECHO MEAS - IVS/LVPW: 1.27 CM
BH CV ECHO MEAS - IVSD: 1.4 CM
BH CV ECHO MEAS - LAT PEAK E' VEL: 7.2 CM/SEC
BH CV ECHO MEAS - LV DIASTOLIC VOL/BSA (35-75): 73.5 CM2
BH CV ECHO MEAS - LV MASS(C)D: 255.5 GRAMS
BH CV ECHO MEAS - LV MAX PG: 8.1 MMHG
BH CV ECHO MEAS - LV MEAN PG: 4 MMHG
BH CV ECHO MEAS - LV SYSTOLIC VOL/BSA (12-30): 22.9 CM2
BH CV ECHO MEAS - LV V1 MAX: 142 CM/SEC
BH CV ECHO MEAS - LV V1 VTI: 33 CM
BH CV ECHO MEAS - LVIDD: 5.1 CM
BH CV ECHO MEAS - LVIDS: 2.8 CM
BH CV ECHO MEAS - LVOT AREA: 3.2 CM2
BH CV ECHO MEAS - LVOT DIAM: 2.02 CM
BH CV ECHO MEAS - LVPWD: 1.1 CM
BH CV ECHO MEAS - MED PEAK E' VEL: 3.9 CM/SEC
BH CV ECHO MEAS - MR MAX PG: 83.7 MMHG
BH CV ECHO MEAS - MR MAX VEL: 457.4 CM/SEC
BH CV ECHO MEAS - MV A DUR: 0.11 SEC
BH CV ECHO MEAS - MV A MAX VEL: 85.1 CM/SEC
BH CV ECHO MEAS - MV DEC SLOPE: 443.7 CM/SEC2
BH CV ECHO MEAS - MV DEC TIME: 0.22 SEC
BH CV ECHO MEAS - MV E MAX VEL: 85.7 CM/SEC
BH CV ECHO MEAS - MV E/A: 1.01
BH CV ECHO MEAS - MV MAX PG: 5 MMHG
BH CV ECHO MEAS - MV MEAN PG: 1.94 MMHG
BH CV ECHO MEAS - MV P1/2T: 78 MSEC
BH CV ECHO MEAS - MV V2 VTI: 30.5 CM
BH CV ECHO MEAS - MVA(P1/2T): 2.8 CM2
BH CV ECHO MEAS - MVA(VTI): 3.5 CM2
BH CV ECHO MEAS - PA ACC TIME: 0.11 SEC
BH CV ECHO MEAS - PA V2 MAX: 112.7 CM/SEC
BH CV ECHO MEAS - PULM A REVS DUR: 0.16 SEC
BH CV ECHO MEAS - PULM A REVS VEL: 39.1 CM/SEC
BH CV ECHO MEAS - PULM DIAS VEL: 32.4 CM/SEC
BH CV ECHO MEAS - PULM S/D: 1.05
BH CV ECHO MEAS - PULM SYS VEL: 33.9 CM/SEC
BH CV ECHO MEAS - QP/QS: 0.62
BH CV ECHO MEAS - RV MAX PG: 3.9 MMHG
BH CV ECHO MEAS - RV V1 MAX: 98.5 CM/SEC
BH CV ECHO MEAS - RV V1 VTI: 20.7 CM
BH CV ECHO MEAS - RVOT DIAM: 2 CM
BH CV ECHO MEAS - SI(MOD-SP2): 24.7 ML/M2
BH CV ECHO MEAS - SI(MOD-SP4): 50.6 ML/M2
BH CV ECHO MEAS - SUP REN AO DIAM: 2.2 CM
BH CV ECHO MEAS - SV(LVOT): 105.2 ML
BH CV ECHO MEAS - SV(MOD-SP2): 41 ML
BH CV ECHO MEAS - SV(MOD-SP4): 84 ML
BH CV ECHO MEAS - SV(RVOT): 64.9 ML
BH CV ECHO MEAS - TAPSE (>1.6): 2.49 CM
BH CV ECHO MEASUREMENTS AVERAGE E/E' RATIO: 15.44
BH CV XLRA - RV BASE: 2.8 CM
BH CV XLRA - RV LENGTH: 6.7 CM
BH CV XLRA - RV MID: 1.73 CM
BH CV XLRA - TDI S': 12.4 CM/SEC
LEFT ATRIUM VOLUME INDEX: 60.2 ML/M2
LV EF 2D ECHO EST: 50 %
SINUS: 3.2 CM
STJ: 2.6 CM

## 2023-09-20 PROCEDURE — 93356 MYOCRD STRAIN IMG SPCKL TRCK: CPT

## 2023-09-20 PROCEDURE — 99214 OFFICE O/P EST MOD 30 MIN: CPT | Performed by: NURSE PRACTITIONER

## 2023-09-20 PROCEDURE — 93306 TTE W/DOPPLER COMPLETE: CPT

## 2023-09-20 PROCEDURE — 25510000001 PERFLUTREN (DEFINITY) 8.476 MG IN SODIUM CHLORIDE (PF) 0.9 % 10 ML INJECTION: Performed by: INTERNAL MEDICINE

## 2023-09-20 PROCEDURE — 93000 ELECTROCARDIOGRAM COMPLETE: CPT | Performed by: NURSE PRACTITIONER

## 2023-09-20 RX ORDER — MULTIVIT AND MINERALS-FERROUS GLUCONATE 9 MG IRON/15 ML ORAL LIQUID 9 MG/15 ML
LIQUID (ML) ORAL DAILY
COMMUNITY

## 2023-09-20 RX ADMIN — PERFLUTREN 1.5 ML: 6.52 INJECTION, SUSPENSION INTRAVENOUS at 09:11

## 2023-09-20 NOTE — PROGRESS NOTES
Date of Office Visit: 2023  Encounter Provider: GERMAINE Membreno  Place of Service: [facility] CARDIOLOGY  Patient Name: Shira Cortes  :1960  Primary Cardiologist: Dr. Jacqui Palencia     Chief Complaint   Patient presents with    Annual Exam   :     HPI: Shira Cortes is a 63 y.o. female who presents today for annual cardiac follow-up visit.  She is a new patient to me and I reviewed her medical records.       In 2010, she was experiencing dizziness and near syncope with exertion.  Echocardiogram showed normal EF, marked septal hypertrophy, severe left ventricular outflow tract obstruction, grade 2 diastolic dysfunction, hyperdynamic small left ventricle, and mild mitral regurgitation.  Cardiac catheterization revealed normal coronary arteries.  Holter monitor showed normal sinus rhythm with PVCs.  Treadmill stress test showed marked hypotension with exercise, LVOT gradient increased with exercise, PVCs increased with exercise, and she had nonsustained ventricular tachycardia.    In 2010, she underwent septal myectomy with plication of A2 of mitral valve leaflet and resection of the cyst or a papillary muscle head to the A3 segment of the mitral valve performed by Dr. Anselmo Diop at the [facility].     In 2020, echocardiogram showed EF 51-55%, moderate concentric hypertrophy, grade 2 diastolic dysfunction, apical hypertrophy with no LVOT gradient, and mild to moderate mitral regurgitation.    She presents today for annual cardiac follow-up visit. She just had her echocardiogram completed and the results are pending.  Her father passed away about 2 years ago and her brother recently  in March of heart problems.  This has been really hard for her.  Her son has been diagnosed with hypertrophic cardiomyopathy and her granddaughter who is 8 has the gene.    She reports occasional lightheadedness with position changes.  She  denies chest pain, shortness of air, palpitations, edema, syncope, or bleeding.  She is still smoking three fourths a pack of cigarettes daily.  Her blood pressure and heart rate are both normal.      Past Medical History:   Diagnosis Date    Abnormal ECG 2004    Basal cell carcinoma 2015    RIGHT CLAVICLE    Congenital heart disease 2009    HCM runs in family mother, brother    Diastolic dysfunction     Frequent headaches     Heart murmur 2009    History of kidney stones     Hypertrophic cardiomyopathy     Mass of right ovary     Migraines     Mitral valve insufficiency     Mitral valve prolapse 2009    had open heart 2009    Pelvic pain     PONV (postoperative nausea and vomiting)     PVC (premature ventricular contraction)     Ventricular tachyarrhythmia        Past Surgical History:   Procedure Laterality Date    CARDIAC CATHETERIZATION  2009    pre op    DIAGNOSTIC LAPAROSCOPY N/A 03/12/2018    Procedure: LAPAROSCOPIC RIGHT SALPINGO OOPHORECTOMY BILATERAL SALPINGECTOMY AND LEFT OVARIAN CYSTECTOMY;  Surgeon: Rochelle Pandey MD;  Location[facility] [place] [facility] [place]  Service: Gynecology    HYSTERECTOMY      INFERIOR OBLIQUE MYECTOMY      MITRAL VALVE REPAIR/REPLACEMENT      SKIN CANCER EXCISION      URETEROSCOPY LASER LITHOTRIPSY WITH STENT INSERTION         Social History     Socioeconomic History    Marital status:    Tobacco Use    Smoking status: Every Day     Packs/day: 0.50     Years: 40.00     Pack years: 20.00     Types: Cigarettes     Last attempt to quit: 3/30/2020     Years since quitting: 3.4    Smokeless tobacco: Never   Vaping Use    Vaping Use: Never used   Substance and Sexual Activity    Alcohol use: Yes     Alcohol/week: 4.0 standard drinks     Types: 4 Cans of beer per week     Comment: Do not drink daily    Drug use: No     Comment: CAFFEINE USE     Sexual activity: Not Currently     Partners: Male     Birth control/protection: Post-menopausal       Family History   Problem Relation Age of Onset     "Heart disease Other     Heart failure Other     Atrial fibrillation Other     Arrhythmia Brother         Has congested heart failure    Heart disease Brother     Heart disease Mother         passed in 2009 end stage HCM    Malig Hyperthermia Neg Hx        The following portion of the patient's history were reviewed and updated as appropriate: past medical history, past surgical history, past social history, past family history, allergies, current medications, and problem list.    Review of Systems   Constitutional: Negative.   Cardiovascular: Negative.    Respiratory: Negative.     Neurological:  Positive for light-headedness.     Allergies   Allergen Reactions    Sulfa Antibiotics Hives    Codeine Other (See Comments)     \"JUST MADE ME FEEL BAD\"  \"JUST MADE ME FEEL BAD\"      Penicillins Hives         Current Outpatient Medications:     aspirin 81 MG tablet, Take 1 tablet by mouth Daily., Disp: , Rfl:     atorvastatin (LIPITOR) 20 MG tablet, Take 1 tablet by mouth every night at bedtime., Disp: 90 tablet, Rfl: 3    metoprolol succinate XL (TOPROL-XL) 25 MG 24 hr tablet, Take 0.5 tablets by mouth Daily., Disp: 45 tablet, Rfl: 3    Multiple Vitamins-Minerals (multivitamin and minerals) liquid liquid, Take  by mouth Daily., Disp: , Rfl:   No current facility-administered medications for this visit.         Objective:     Vitals:    09/20/23 0906   BP: 105/68   BP Location: Right arm   Patient Position: Sitting   Cuff Size: Adult   Pulse: 57   Weight: 62.6 kg (138 lb)   Height: 162.6 cm (64.02\")     Body mass index is 23.68 kg/m².    PHYSICAL EXAM:    Vitals Reviewed.   General Appearance: No acute distress, well developed and well nourished.    Eyes: Glasses.   HENT: No hearing loss noted.    Respiratory: No signs of respiratory distress. Respiration rhythm and depth normal.  Clear to auscultation.   Cardiovascular:  Jugular Venous Pressure: Normal  Heart Rate and Rhythm: Normal, Heart Sounds: Normal S1 and S2. No S3 " or S4 noted.  Murmurs: No murmurs noted. No rubs, thrills, or gallops.   Lower Extremities: No edema noted.  Musculoskeletal: Normal movement of extremities.  Skin: General appearance normal.    Psychiatric: Patient alert and oriented to person, place, and time. Speech and behavior appropriate. Normal mood and affect.       ECG 12 Lead    Date/Time: 9/20/2023 9:07 AM  Performed by: Meme Jane APRN  Authorized by: Meme Jane APRN   Comparison: compared with previous ECG from 10/5/2022  Similar to previous ECG  Rhythm: sinus rhythm  Rate: bradycardic  BPM: 57  Conduction: left bundle branch block  ST Segments: ST segments normal  T inversion: I, aVL, V3, V4, V5 and V6  QRS axis: normal    Clinical impression: abnormal EKG          Assessment:       Diagnosis Plan   1. Cardiomyopathy, hypertrophic, primary familial  ECG 12 Lead      2. Diastolic dysfunction  ECG 12 Lead      3. S/P MVR (mitral valve repair)  ECG 12 Lead      4. Nonrheumatic mitral valve regurgitation  ECG 12 Lead      5. LBBB (left bundle branch block)        6. Mixed hyperlipidemia  ECG 12 Lead      7. Tobacco use  ECG 12 Lead             Plan:       1.  Hypertrophic Cardiomyopathy: Underwent septal myectomy in December 2010 at [facility].  In 2020, echocardiogram showed normal gradients.  Echocardiogram from today pending and we will call results.  Continue metoprolol and aspirin.    2.  Diastolic Dysfunction: Noted on echocardiogram.    3/4.  History of mitral valve repair in 2010.  Mild to moderate mitral regurgitation noted per echo 2020.    5.  Left bundle branch block: Chronic and unchanged.    6.  Hyperlipidemia: Followed by her PCP and remains on atorvastatin.    7.  She would highly benefit from staining from cigarette smoking.    8.  I recommend a 1 year follow-up visit with Dr. Palencia.    ADDENDUM 9/22/2023:  Echocardiogram showed low normal EF, moderate septal asymmetric hypertrophy, no LVOT gradient, grade 2  diastolic dysfunction, and mild mitral regurgitation.  Dr. Palencia noticed speckled appearance on the echo and ordered [facility] imaging.  Dr. Palencia discussed results with patient via phone.    As always, it has been a pleasure to participate in your patient's care. Thank you.         Sincerely,         GERMAINE Sanchez  [facility] Cardiology      Dictated utilizing Dragon Dictation

## 2023-09-21 ENCOUNTER — TELEPHONE (OUTPATIENT)
Dept: CARDIOLOGY | Facility: CLINIC | Age: 63
End: 2023-09-21
Payer: COMMERCIAL

## 2023-09-21 DIAGNOSIS — I42.2 CARDIOMYOPATHY, HYPERTROPHIC, PRIMARY FAMILIAL: ICD-10-CM

## 2023-09-21 DIAGNOSIS — Z98.890 S/P MVR (MITRAL VALVE REPAIR): Primary | ICD-10-CM

## 2023-10-23 RX ORDER — METOPROLOL SUCCINATE 25 MG/1
TABLET, EXTENDED RELEASE ORAL
Qty: 45 TABLET | Refills: 1 | Status: SHIPPED | OUTPATIENT
Start: 2023-10-23

## 2023-10-30 ENCOUNTER — HOSPITAL ENCOUNTER (OUTPATIENT)
Dept: CARDIOLOGY | Facility: HOSPITAL | Age: 63
Discharge: HOME | End: 2023-10-30
Admitting: INTERNAL MEDICINE
Payer: COMMERCIAL

## 2023-10-30 ENCOUNTER — TELEPHONE (OUTPATIENT)
Dept: CARDIOLOGY | Facility: CLINIC | Age: 63
End: 2023-10-30
Payer: COMMERCIAL

## 2023-10-30 VITALS — BODY MASS INDEX: 24.27 KG/M2 | HEIGHT: 63 IN | WEIGHT: 137 LBS

## 2023-10-30 DIAGNOSIS — Z98.890 S/P MVR (MITRAL VALVE REPAIR): ICD-10-CM

## 2023-10-30 DIAGNOSIS — I42.2 CARDIOMYOPATHY, HYPERTROPHIC, PRIMARY FAMILIAL: ICD-10-CM

## 2023-10-30 PROCEDURE — 78803 RP LOCLZJ TUM SPECT 1 AREA: CPT

## 2023-10-30 PROCEDURE — A9538 TC99M PYROPHOSPHATE: HCPCS | Performed by: INTERNAL MEDICINE

## 2023-10-30 PROCEDURE — 0 TECHNETIUM TC99M PYROPHOSPHATE: Performed by: INTERNAL MEDICINE

## 2023-10-30 PROCEDURE — 78803 RP LOCLZJ TUM SPECT 1 AREA: CPT | Performed by: INTERNAL MEDICINE

## 2023-10-30 RX ADMIN — TECHNETIUM TC99M PYROPHOSPHATE 1 DOSE: 12 INJECTION INTRAVENOUS at 11:10

## 2023-10-31 NOTE — TELEPHONE ENCOUNTER
Reviewed results with Shira Cortes and patient verbalized understanding of results.    Thank you,  Ana PRYOR RN  Triage Nurse [facility]    10:00 EDT     Humira Counseling:  I discussed with the patient the risks of adalimumab including but not limited to myelosuppression, immunosuppression, autoimmune hepatitis, demyelinating diseases, lymphoma, and serious infections.  The patient understands that monitoring is required including a PPD at baseline and must alert us or the primary physician if symptoms of infection or other concerning signs are noted.

## 2024-02-22 ENCOUNTER — TELEPHONE (OUTPATIENT)
Dept: CARDIOLOGY | Facility: CLINIC | Age: 64
End: 2024-02-22
Payer: COMMERCIAL

## 2024-02-26 NOTE — TELEPHONE ENCOUNTER
Ms. Shira Cortes has been recommended to undergo left total hip arthroplasty on 4/15/2024 by Dr. Augustine Joya.  Perioperative cardiac risk assessment has been requested.    She has a history of hypertrophic cardiomyopathy and underwent septal myectomy and mitral valve resection in December 2010 at the [facility].  She was last seen in our office in September 2023 and denied symptoms.  Dr. Palencia said her echocardiogram was stable and [facility] imaging was negative for ATTR amyloidosis.  She has a chronically abnormal EKG showing normal sinus rhythm with left bundle branch block.    I spoke with patient via telephone.  She denies chest pain, shortness of breath, palpitations, dizziness, or syncope.     According to the George's Revised Cardiac Risk Index, Ms. Cortes is considered very low risk (0.4%) of adverse cardiovascular events occurring with moderate risk surgery.  She may hold her aspirin 3 to 5 days before surgery according to your discretion and needs to restart postoperatively.  She will take her metoprolol succinate the night before surgery.    Thank you.  Letter faxed to Dr. Joya.

## 2024-03-04 RX ORDER — METOPROLOL SUCCINATE 25 MG/1
TABLET, EXTENDED RELEASE ORAL
Qty: 45 TABLET | Refills: 1 | Status: SHIPPED | OUTPATIENT
Start: 2024-03-04

## 2024-04-11 ENCOUNTER — TELEPHONE (OUTPATIENT)
Dept: CARDIOLOGY | Facility: CLINIC | Age: 64
End: 2024-04-11
Payer: COMMERCIAL

## 2024-04-11 NOTE — TELEPHONE ENCOUNTER
"    Caller: Shira Cortes \"Renu\"    Relationship: Self    Best call back number: [phone]    What form or medical record are you requesting: CARDIAC CLEARANCE FOR DR TREVINO FOR LEFT HIP REPLACEMENT    Who is requesting this form or medical record from you: DR TREVINO OFFICE     How would you like to receive the form or medical records (pick-up, mail, fax): COULD YOU REFAX THIS [phone] ATTN: ZAIRA NIX    THIS WAS WAS FAXED 02/26/2024      Timeframe paperwork needed: ASAP HER SURGERY IS 04/15/2024 AND THEY DO NOT HAVE CLEARANCE              "

## 2024-04-11 NOTE — TELEPHONE ENCOUNTER
I just re-faxed the paperwork to Dr. Bautista's office and patient was called to inform her. Fax confirmation page received.       Fax# [phone]  Attn: Rafita Gaspar

## 2024-10-22 ENCOUNTER — HOSPITAL ENCOUNTER (OUTPATIENT)
Dept: GENERAL RADIOLOGY | Facility: HOSPITAL | Age: 64
Discharge: HOME | End: 2024-10-22
Admitting: NURSE PRACTITIONER
Payer: MEDICARE

## 2024-10-22 ENCOUNTER — OFFICE VISIT (OUTPATIENT)
Dept: CARDIOLOGY | Facility: CLINIC | Age: 64
End: 2024-10-22
Payer: MEDICARE

## 2024-10-22 VITALS
HEIGHT: 63 IN | WEIGHT: 131 LBS | DIASTOLIC BLOOD PRESSURE: 80 MMHG | SYSTOLIC BLOOD PRESSURE: 118 MMHG | BODY MASS INDEX: 23.21 KG/M2 | HEART RATE: 64 BPM

## 2024-10-22 DIAGNOSIS — I44.7 LBBB (LEFT BUNDLE BRANCH BLOCK): ICD-10-CM

## 2024-10-22 DIAGNOSIS — I42.2 CARDIOMYOPATHY, HYPERTROPHIC, PRIMARY FAMILIAL: ICD-10-CM

## 2024-10-22 DIAGNOSIS — R10.32 ABDOMINAL PAIN, LEFT LOWER QUADRANT: ICD-10-CM

## 2024-10-22 DIAGNOSIS — R06.09 DYSPNEA ON EXERTION: Primary | ICD-10-CM

## 2024-10-22 DIAGNOSIS — R94.30 ABNORMAL RESULT OF CARDIOVASCULAR FUNCTION STUDY, UNSPECIFIED: ICD-10-CM

## 2024-10-22 DIAGNOSIS — E78.2 MIXED HYPERLIPIDEMIA: ICD-10-CM

## 2024-10-22 DIAGNOSIS — N20.0 CALCULUS, RENAL: ICD-10-CM

## 2024-10-22 DIAGNOSIS — Z72.0 TOBACCO USE: ICD-10-CM

## 2024-10-22 DIAGNOSIS — Z98.890 S/P MVR (MITRAL VALVE REPAIR): ICD-10-CM

## 2024-10-22 DIAGNOSIS — R07.89 OTHER CHEST PAIN: ICD-10-CM

## 2024-10-22 PROCEDURE — 1160F RVW MEDS BY RX/DR IN RCRD: CPT | Performed by: INTERNAL MEDICINE

## 2024-10-22 PROCEDURE — 1159F MED LIST DOCD IN RCRD: CPT | Performed by: INTERNAL MEDICINE

## 2024-10-22 PROCEDURE — 93000 ELECTROCARDIOGRAM COMPLETE: CPT | Performed by: INTERNAL MEDICINE

## 2024-10-22 PROCEDURE — 99214 OFFICE O/P EST MOD 30 MIN: CPT | Performed by: INTERNAL MEDICINE

## 2024-10-22 PROCEDURE — 74018 RADEX ABDOMEN 1 VIEW: CPT

## 2024-10-22 RX ORDER — METOPROLOL SUCCINATE 25 MG/1
12.5 TABLET, EXTENDED RELEASE ORAL NIGHTLY
Qty: 45 TABLET | Refills: 3 | Status: SHIPPED | OUTPATIENT
Start: 2024-10-22 | End: 2024-10-25 | Stop reason: SDUPTHER

## 2024-10-22 RX ORDER — IVABRADINE 5 MG/1
15 TABLET, FILM COATED ORAL ONCE
OUTPATIENT
Start: 2024-10-23 | End: 2024-10-22

## 2024-10-22 RX ORDER — SODIUM CHLORIDE 0.9 % (FLUSH) 0.9 %
10 SYRINGE (ML) INJECTION AS NEEDED
OUTPATIENT
Start: 2024-10-22

## 2024-10-22 RX ORDER — METOPROLOL TARTRATE 25 MG/1
25 TABLET, FILM COATED ORAL ONCE
OUTPATIENT
Start: 2024-10-23

## 2024-10-22 RX ORDER — METOPROLOL TARTRATE 25 MG/1
200 TABLET, FILM COATED ORAL ONCE
OUTPATIENT
Start: 2024-10-23 | End: 2024-10-22

## 2024-10-22 RX ORDER — METOPROLOL TARTRATE 25 MG/1
100 TABLET, FILM COATED ORAL ONCE
OUTPATIENT
Start: 2024-10-23

## 2024-10-22 RX ORDER — ALENDRONATE SODIUM 70 MG/1
70 TABLET ORAL
COMMUNITY
Start: 2024-09-14

## 2024-10-22 RX ORDER — METOPROLOL TARTRATE 50 MG
50 TABLET ORAL
OUTPATIENT
Start: 2024-10-22

## 2024-10-22 RX ORDER — NITROGLYCERIN 0.4 MG/1
0.8 TABLET SUBLINGUAL
OUTPATIENT
Start: 2024-10-23

## 2024-10-22 RX ORDER — NITROGLYCERIN 0.4 MG/1
0.4 TABLET SUBLINGUAL
OUTPATIENT
Start: 2024-10-23 | End: 2024-10-22

## 2024-10-22 RX ORDER — SODIUM CHLORIDE 0.9 % (FLUSH) 0.9 %
10 SYRINGE (ML) INJECTION EVERY 12 HOURS SCHEDULED
OUTPATIENT
Start: 2024-10-23

## 2024-10-22 RX ORDER — METOPROLOL SUCCINATE 25 MG/1
12.5 TABLET, EXTENDED RELEASE ORAL NIGHTLY
Qty: 45 TABLET | Refills: 3 | Status: SHIPPED | OUTPATIENT
Start: 2024-10-22 | End: 2024-10-22 | Stop reason: SDUPTHER

## 2024-10-22 RX ORDER — METOPROLOL TARTRATE 1 MG/ML
5 INJECTION, SOLUTION INTRAVENOUS
OUTPATIENT
Start: 2024-10-22

## 2024-10-22 RX ORDER — METOPROLOL TARTRATE 25 MG/1
50 TABLET, FILM COATED ORAL ONCE
OUTPATIENT
Start: 2024-10-23

## 2024-10-22 RX ORDER — SODIUM CHLORIDE 9 MG/ML
40 INJECTION, SOLUTION INTRAVENOUS AS NEEDED
OUTPATIENT
Start: 2024-10-22 | End: 2024-10-23

## 2024-10-22 RX ORDER — METOPROLOL TARTRATE 25 MG/1
150 TABLET, FILM COATED ORAL ONCE
OUTPATIENT
Start: 2024-10-23

## 2024-10-22 NOTE — PROGRESS NOTES
Date of Office Visit: 10/22/2024  Encounter Provider: Jacqui Palencia MD  Place of Service: [facility] CARDIOLOGY  Patient Name: Shira Cortes  :1960      Patient ID:  Shira Cortes is a 64 y.o. female is here for  followup for hypertrophic cardiomyopathy.        History of Present Illness    She has a history of obstructive hypertrophic cardiomyopathy with mitral insufficiency- s/p septal myectomy and mitral valve repair done 2010 at [facility], hyperlipidemia, tobacco use, family history of hypertrophic cardiomyopathy.    She was first evaluated on 2010 secondary to complaints of dizziness, and near  syncope with exertion. Her echocardiogram showed marked septal hypertrophy with systolic  anterior movement of the anterior mitral leaflet and severe left ventricular outflow tract  obstruction with a peak gradient of 72 mmHg. There was grade II diastolic dysfunction and  a hyperdynamic small size left ventricle. There was also mild mitral insufficiency. She  had a cardiac catheterization performed on 2010 which showed normal coronaries and  hypertrophic myopathy. She then had a Holter monitor recording performed which showed  premature ventricular complexes. She also had a stress echocardiogram performed which  showed hypotension with exercise and severe systolic anterior movement of the mitral  leaflet at rest with exercise and with exercise. The left ventricular outflow tract  gradient increased with exercise as well as the number of premature ventricular complexes.  She also had nonsustained ventricular tachycardia with exercise.      She was referred to [facility] for a septal myectomy which she has had performed on  2010. She had septal myectomy with plication of A2 of mitral valve leaflet and  resection of the accessory papillary muscle head to the A3 segment of the mitral valve  performed by Dr. Anselmo Diop at the [facility]. She had a cardiac MRI, which  confirmed hypertrophic cardiomyopathy. Her postoperative echocardiogram showed an  ejection fraction of 65%, no systolic anterior movement of the mitral leaflet and 1+  mitral insufficiency. Her resting left ventricular outflow tract gradient was 26 mmHg and  there was no increase in her mitral insufficiency and left ventricular outflow tract  gradient after amyl nitrite.      Echocardiogram done 9/20/2023 showed moderate septal asymmetric hypertrophy, hypokinetic apical septum and mid anterior septal wall, grade 2 diastolic dysfunction, severe left atrial enlargement, global longitudinal strain of -13.1%, low normal LVEF, no LVOT gradient, mild mitral insufficiency with systolic anterior motion of the chordal apparatus, no pericardial effusion.  Pyrophosphate study done 10/30/2023 was not suggestive of ATTR amyloidosis.  Labs on 3/18/2023 showed glucose 54 with otherwise normal BMP and normal CBC.  I do not have recent lipid panel.    She has had some mild exertional dyspnea.  She has had no real chest pain and does not feel her heart racing or skipping.  She is having these precordial anxious spells which she had before her septal myectomy.  This concerns her.  She has severe fall with injury and fractured her right hip while in [place].  She recently had a left hip replacement and at some point will need a right hip replacement.  She has no orthopnea or PND.  She does continue to smoke.  She takes her medications as directed.    Past Medical History:   Diagnosis Date    Abnormal ECG 2004    Basal cell carcinoma 2015    RIGHT CLAVICLE    Congenital heart disease 2009    HCM runs in family mother, brother    Diastolic dysfunction     Frequent headaches     Heart murmur 2009    History of kidney stones     Hypertrophic cardiomyopathy     Mass of right ovary     Migraines     Mitral valve insufficiency     Mitral valve prolapse 2009    had open heart 2009    Pelvic pain     PONV  (postoperative nausea and vomiting)     PVC (premature ventricular contraction)     Ventricular tachyarrhythmia          Past Surgical History:   Procedure Laterality Date    CARDIAC CATHETERIZATION      pre op    DIAGNOSTIC LAPAROSCOPY N/A 2018    Procedure: LAPAROSCOPIC RIGHT SALPINGO OOPHORECTOMY BILATERAL SALPINGECTOMY AND LEFT OVARIAN CYSTECTOMY;  Surgeon: Rochelle Pandey MD;  Location: [facility];  Service: Gynecology    HYSTERECTOMY      INFERIOR OBLIQUE MYECTOMY      MITRAL VALVE REPAIR/REPLACEMENT      SKIN CANCER EXCISION      URETEROSCOPY LASER LITHOTRIPSY WITH STENT INSERTION         Current Outpatient Medications on File Prior to Visit   Medication Sig Dispense Refill    alendronate (FOSAMAX) 70 MG tablet Take 1 tablet by mouth Every 7 (Seven) Days.      aspirin 81 MG tablet Take 1 tablet by mouth Daily.      atorvastatin (LIPITOR) 20 MG tablet Take 1 tablet by mouth every night at bedtime. 90 tablet 3    Multiple Vitamins-Minerals (multivitamin and minerals) liquid liquid Take  by mouth Daily.      [DISCONTINUED] metoprolol succinate XL (TOPROL-XL) 25 MG 24 hr tablet TAKE A HALF TABLET BY MOUTH EVERY DAY 45 tablet 1     No current facility-administered medications on file prior to visit.       Social History     Socioeconomic History    Marital status:    Tobacco Use    Smoking status: Every Day     Current packs/day: 0.00     Average packs/day: 0.5 packs/day for 40.0 years (20.0 ttl pk-yrs)     Types: Cigarettes     Start date: 3/30/1980     Last attempt to quit: 3/30/2020     Years since quittin.5     Passive exposure: Current    Smokeless tobacco: Never   Vaping Use    Vaping status: Never Used   Substance and Sexual Activity    Alcohol use: Yes     Alcohol/week: 4.0 standard drinks of alcohol     Types: 4 Cans of beer per week     Comment: Do not drink daily    Drug use: No     Comment: CAFFEINE USE     Sexual activity: Not Currently     Partners: Male     Birth  "control/protection: Post-menopausal             Procedures    ECG 12 Lead    Date/Time: 10/22/2024 12:42 PM  Performed by: Jacqui Palencia MD    Authorized by: Jacqui Palencia MD  Comparison: compared with previous ECG   Similar to previous ECG  Rhythm: sinus rhythm  Conduction: left bundle branch block    Clinical impression: abnormal EKG              Objective:      Vitals:    10/22/24 1158   BP: 118/80   Pulse: 64   Weight: 59.4 kg (131 lb)   Height: 160 cm (63\")     Body mass index is 23.21 kg/m².    Vitals reviewed.   Constitutional:       General: Not in acute distress.     Appearance: Not diaphoretic.   Neck:      Vascular: No carotid bruit or JVD.   Pulmonary:      Effort: Pulmonary effort is normal.      Breath sounds: Normal breath sounds.   Cardiovascular:      Normal rate. Regular rhythm.      Murmurs: There is a grade 3/6 holosystolic murmur at the LLSB.      No gallop.    Pulses:     Intact distal pulses.      Carotid: 2+ bilaterally.     Radial: 2+ bilaterally.     Dorsalis pedis: 2+ bilaterally.     Posterior tibial: 2+ bilaterally.  Edema:     Peripheral edema absent.   Neurological:      Cranial Nerves: No cranial nerve deficit.         Lab Review:       Assessment:      Diagnosis Plan   1. Dyspnea on exertion  CT Angiogram Coronary    CT Angiogram Coronary-Cardiology Interpretation    metoprolol tartrate (LOPRESSOR) tablet 200 mg    metoprolol tartrate (LOPRESSOR) tablet 150 mg    metoprolol tartrate (LOPRESSOR) tablet 100 mg    metoprolol tartrate (LOPRESSOR) tablet 50 mg    metoprolol tartrate (LOPRESSOR) tablet 25 mg    metoprolol tartrate (LOPRESSOR) tablet 50 mg    metoprolol tartrate (LOPRESSOR) injection 5 mg    nitroglycerin (NITROSTAT) SL tablet 0.4 mg    nitroglycerin (NITROSTAT) SL tablet 0.8 mg    ivabradine HCl (CORLANOR) tablet 15 mg    No Caffeine or Nicotine 4 Hours Prior to CTA Appointment    Nothing to Eat or Drink 4 Hours Prior to CTA Appointment    Do Not Take " Phosphodiasterase Inhibitors in the 72 Hours Prior to Coronary CTA    Obtain Informed Consent - Computed Tomography Angiography of Chest - Angiogram of Coronary Arteries    Vital Signs Upon Arrival    Cardiac Monitoring    Verify NPO Status - Patient to Be NPO at Least 4 Hours Prior to CTA    Notify CT After Administration of metoprolol tartrate (LOPRESSOR) tablet    Notify Provider If Total Metoprolol Given Equals 300mg & Heart Rate Not At Goal    Notify Provider Prior to Administration of Nitroglycerin if Patient SBP <80    POC Creatinine    Insert Peripheral IV    Saline Lock & Maintain IV Access    sodium chloride 0.9 % flush 10 mL    sodium chloride 0.9 % flush 10 mL    sodium chloride 0.9 % infusion 40 mL    Vital Signs - See Instructions    Hold Medication Metformin (Glucophage, Glucophage XR, Fortament, Glumetza);  Metglip (metformin/glipizide);  Glucovance (metformin/glyburide); Avandamet (metformin/rosiglitazone)    Patient May Discharge Home After Procedure Complete (If Stable)    No Metoprolol Prescription Needed    Lipid Panel    Comprehensive Metabolic Panel    CBC & Differential    Magnesium    TSH    Uric Acid    proBNP    High Sensitivity Troponin T    Apolipoprotein B    Lipoprotein A (LPA)      2. S/P MVR (mitral valve repair)  CT Angiogram Coronary    CT Angiogram Coronary-Cardiology Interpretation    metoprolol tartrate (LOPRESSOR) tablet 200 mg    metoprolol tartrate (LOPRESSOR) tablet 150 mg    metoprolol tartrate (LOPRESSOR) tablet 100 mg    metoprolol tartrate (LOPRESSOR) tablet 50 mg    metoprolol tartrate (LOPRESSOR) tablet 25 mg    metoprolol tartrate (LOPRESSOR) tablet 50 mg    metoprolol tartrate (LOPRESSOR) injection 5 mg    nitroglycerin (NITROSTAT) SL tablet 0.4 mg    nitroglycerin (NITROSTAT) SL tablet 0.8 mg    ivabradine HCl (CORLANOR) tablet 15 mg    No Caffeine or Nicotine 4 Hours Prior to CTA Appointment    Nothing to Eat or Drink 4 Hours Prior to CTA Appointment    Do Not Take  Phosphodiasterase Inhibitors in the 72 Hours Prior to Coronary CTA    Obtain Informed Consent - Computed Tomography Angiography of Chest - Angiogram of Coronary Arteries    Vital Signs Upon Arrival    Cardiac Monitoring    Verify NPO Status - Patient to Be NPO at Least 4 Hours Prior to CTA    Notify CT After Administration of metoprolol tartrate (LOPRESSOR) tablet    Notify Provider If Total Metoprolol Given Equals 300mg & Heart Rate Not At Goal    Notify Provider Prior to Administration of Nitroglycerin if Patient SBP <80    POC Creatinine    Insert Peripheral IV    Saline Lock & Maintain IV Access    sodium chloride 0.9 % flush 10 mL    sodium chloride 0.9 % flush 10 mL    sodium chloride 0.9 % infusion 40 mL    Vital Signs - See Instructions    Hold Medication Metformin (Glucophage, Glucophage XR, Fortament, Glumetza);  Metglip (metformin/glipizide);  Glucovance (metformin/glyburide); Avandamet (metformin/rosiglitazone)    Patient May Discharge Home After Procedure Complete (If Stable)    No Metoprolol Prescription Needed      3. Cardiomyopathy, hypertrophic, primary familial  CT Angiogram Coronary    CT Angiogram Coronary-Cardiology Interpretation    metoprolol tartrate (LOPRESSOR) tablet 200 mg    metoprolol tartrate (LOPRESSOR) tablet 150 mg    metoprolol tartrate (LOPRESSOR) tablet 100 mg    metoprolol tartrate (LOPRESSOR) tablet 50 mg    metoprolol tartrate (LOPRESSOR) tablet 25 mg    metoprolol tartrate (LOPRESSOR) tablet 50 mg    metoprolol tartrate (LOPRESSOR) injection 5 mg    nitroglycerin (NITROSTAT) SL tablet 0.4 mg    nitroglycerin (NITROSTAT) SL tablet 0.8 mg    ivabradine HCl (CORLANOR) tablet 15 mg    No Caffeine or Nicotine 4 Hours Prior to CTA Appointment    Nothing to Eat or Drink 4 Hours Prior to CTA Appointment    Do Not Take Phosphodiasterase Inhibitors in the 72 Hours Prior to Coronary CTA    Obtain Informed Consent - Computed Tomography Angiography of Chest - Angiogram of Coronary Arteries     Vital Signs Upon Arrival    Cardiac Monitoring    Verify NPO Status - Patient to Be NPO at Least 4 Hours Prior to CTA    Notify CT After Administration of metoprolol tartrate (LOPRESSOR) tablet    Notify Provider If Total Metoprolol Given Equals 300mg & Heart Rate Not At Goal    Notify Provider Prior to Administration of Nitroglycerin if Patient SBP <80    POC Creatinine    Insert Peripheral IV    Saline Lock & Maintain IV Access    sodium chloride 0.9 % flush 10 mL    sodium chloride 0.9 % flush 10 mL    sodium chloride 0.9 % infusion 40 mL    Vital Signs - See Instructions    Hold Medication Metformin (Glucophage, Glucophage XR, Fortament, Glumetza);  Metglip (metformin/glipizide);  Glucovance (metformin/glyburide); Avandamet (metformin/rosiglitazone)    Patient May Discharge Home After Procedure Complete (If Stable)    No Metoprolol Prescription Needed      4. Abnormal result of cardiovascular function study, unspecified  CT Angiogram Coronary      5. Other chest pain  CT Angiogram Coronary-Cardiology Interpretation      6. Mixed hyperlipidemia  Lipid Panel    Comprehensive Metabolic Panel    CBC & Differential    Magnesium    TSH    Uric Acid    proBNP    High Sensitivity Troponin T    Apolipoprotein B    Lipoprotein A (LPA)      7. LBBB (left bundle branch block)  Lipid Panel    Comprehensive Metabolic Panel    CBC & Differential    Magnesium    TSH    Uric Acid    proBNP    High Sensitivity Troponin T    Apolipoprotein B    Lipoprotein A (LPA)      8. Tobacco use  Lipid Panel    Comprehensive Metabolic Panel    CBC & Differential    Magnesium    TSH    Uric Acid    proBNP    High Sensitivity Troponin T    Apolipoprotein B    Lipoprotein A (LPA)                 Hypertrophic obstructive cardiomyopathy- s/p septal myectomy with plication of A2 of the mitral leaflet and resection of the accessory papillary muscle head to A3 of the mitral valve done 12/29/2010 at [facility].  Has no LVOT obstruction.  On  metoprolol and aspirin for this.  Mitral insufficiency, mild to moderate  Hyperlipidemia, on atorvastatin  Tobacco use, advised cessation  Right femoral neck fracture after a fall in [place], surgery was not required, status post left hip surgery.  Anxiety, felt in her chest, with mild exertional dyspnea, consider coronary CTA looking for coronary artery disease.  LBBB, chronic    Plan:       No medication changes, set testing as noted above, also needs lipid panel.  See me in 1 year.  Will need to repeat echocardiogram next year.

## 2024-10-22 NOTE — H&P (VIEW-ONLY)
Date of Office Visit: 10/22/2024  Encounter Provider: Jacqui Palencia MD  Place of Service: [facility] CARDIOLOGY  Patient Name: Shira Cortes  :1960      Patient ID:  Shira Cortes is a 64 y.o. female is here for  followup for hypertrophic cardiomyopathy.        History of Present Illness    She has a history of obstructive hypertrophic cardiomyopathy with mitral insufficiency- s/p septal myectomy and mitral valve repair done 2010 at [facility], hyperlipidemia, tobacco use, family history of hypertrophic cardiomyopathy.    She was first evaluated on 2010 secondary to complaints of dizziness, and near  syncope with exertion. Her echocardiogram showed marked septal hypertrophy with systolic  anterior movement of the anterior mitral leaflet and severe left ventricular outflow tract  obstruction with a peak gradient of 72 mmHg. There was grade II diastolic dysfunction and  a hyperdynamic small size left ventricle. There was also mild mitral insufficiency. She  had a cardiac catheterization performed on 2010 which showed normal coronaries and  hypertrophic myopathy. She then had a Holter monitor recording performed which showed  premature ventricular complexes. She also had a stress echocardiogram performed which  showed hypotension with exercise and severe systolic anterior movement of the mitral  leaflet at rest with exercise and with exercise. The left ventricular outflow tract  gradient increased with exercise as well as the number of premature ventricular complexes.  She also had nonsustained ventricular tachycardia with exercise.      She was referred to [facility] for a septal myectomy which she has had performed on  2010. She had septal myectomy with plication of A2 of mitral valve leaflet and  resection of the accessory papillary muscle head to the A3 segment of the mitral valve  performed by Dr. Anselmo Diop at the [facility]. She had a cardiac MRI, which  confirmed hypertrophic cardiomyopathy. Her postoperative echocardiogram showed an  ejection fraction of 65%, no systolic anterior movement of the mitral leaflet and 1+  mitral insufficiency. Her resting left ventricular outflow tract gradient was 26 mmHg and  there was no increase in her mitral insufficiency and left ventricular outflow tract  gradient after amyl nitrite.      Echocardiogram done 9/20/2023 showed moderate septal asymmetric hypertrophy, hypokinetic apical septum and mid anterior septal wall, grade 2 diastolic dysfunction, severe left atrial enlargement, global longitudinal strain of -13.1%, low normal LVEF, no LVOT gradient, mild mitral insufficiency with systolic anterior motion of the chordal apparatus, no pericardial effusion.  Pyrophosphate study done 10/30/2023 was not suggestive of ATTR amyloidosis.  Labs on 3/18/2023 showed glucose 54 with otherwise normal BMP and normal CBC.  I do not have recent lipid panel.    She has had some mild exertional dyspnea.  She has had no real chest pain and does not feel her heart racing or skipping.  She is having these precordial anxious spells which she had before her septal myectomy.  This concerns her.  She has severe fall with injury and fractured her right hip while in [place].  She recently had a left hip replacement and at some point will need a right hip replacement.  She has no orthopnea or PND.  She does continue to smoke.  She takes her medications as directed.    Past Medical History:   Diagnosis Date    Abnormal ECG 2004    Basal cell carcinoma 2015    RIGHT CLAVICLE    Congenital heart disease 2009    HCM runs in family mother, brother    Diastolic dysfunction     Frequent headaches     Heart murmur 2009    History of kidney stones     Hypertrophic cardiomyopathy     Mass of right ovary     Migraines     Mitral valve insufficiency     Mitral valve prolapse 2009    had open heart 2009    Pelvic pain     PONV  (postoperative nausea and vomiting)     PVC (premature ventricular contraction)     Ventricular tachyarrhythmia          Past Surgical History:   Procedure Laterality Date    CARDIAC CATHETERIZATION      pre op    DIAGNOSTIC LAPAROSCOPY N/A 2018    Procedure: LAPAROSCOPIC RIGHT SALPINGO OOPHORECTOMY BILATERAL SALPINGECTOMY AND LEFT OVARIAN CYSTECTOMY;  Surgeon: Rochelle Pandey MD;  Location: [facility];  Service: Gynecology    HYSTERECTOMY      INFERIOR OBLIQUE MYECTOMY      MITRAL VALVE REPAIR/REPLACEMENT      SKIN CANCER EXCISION      URETEROSCOPY LASER LITHOTRIPSY WITH STENT INSERTION         Current Outpatient Medications on File Prior to Visit   Medication Sig Dispense Refill    alendronate (FOSAMAX) 70 MG tablet Take 1 tablet by mouth Every 7 (Seven) Days.      aspirin 81 MG tablet Take 1 tablet by mouth Daily.      atorvastatin (LIPITOR) 20 MG tablet Take 1 tablet by mouth every night at bedtime. 90 tablet 3    Multiple Vitamins-Minerals (multivitamin and minerals) liquid liquid Take  by mouth Daily.      [DISCONTINUED] metoprolol succinate XL (TOPROL-XL) 25 MG 24 hr tablet TAKE A HALF TABLET BY MOUTH EVERY DAY 45 tablet 1     No current facility-administered medications on file prior to visit.       Social History     Socioeconomic History    Marital status:    Tobacco Use    Smoking status: Every Day     Current packs/day: 0.00     Average packs/day: 0.5 packs/day for 40.0 years (20.0 ttl pk-yrs)     Types: Cigarettes     Start date: 3/30/1980     Last attempt to quit: 3/30/2020     Years since quittin.5     Passive exposure: Current    Smokeless tobacco: Never   Vaping Use    Vaping status: Never Used   Substance and Sexual Activity    Alcohol use: Yes     Alcohol/week: 4.0 standard drinks of alcohol     Types: 4 Cans of beer per week     Comment: Do not drink daily    Drug use: No     Comment: CAFFEINE USE     Sexual activity: Not Currently     Partners: Male     Birth  "control/protection: Post-menopausal             Procedures    ECG 12 Lead    Date/Time: 10/22/2024 12:42 PM  Performed by: Jacqui Palencia MD    Authorized by: Jacqui Palencia MD  Comparison: compared with previous ECG   Similar to previous ECG  Rhythm: sinus rhythm  Conduction: left bundle branch block    Clinical impression: abnormal EKG              Objective:      Vitals:    10/22/24 1158   BP: 118/80   Pulse: 64   Weight: 59.4 kg (131 lb)   Height: 160 cm (63\")     Body mass index is 23.21 kg/m².    Vitals reviewed.   Constitutional:       General: Not in acute distress.     Appearance: Not diaphoretic.   Neck:      Vascular: No carotid bruit or JVD.   Pulmonary:      Effort: Pulmonary effort is normal.      Breath sounds: Normal breath sounds.   Cardiovascular:      Normal rate. Regular rhythm.      Murmurs: There is a grade 3/6 holosystolic murmur at the LLSB.      No gallop.    Pulses:     Intact distal pulses.      Carotid: 2+ bilaterally.     Radial: 2+ bilaterally.     Dorsalis pedis: 2+ bilaterally.     Posterior tibial: 2+ bilaterally.  Edema:     Peripheral edema absent.   Neurological:      Cranial Nerves: No cranial nerve deficit.         Lab Review:       Assessment:      Diagnosis Plan   1. Dyspnea on exertion  CT Angiogram Coronary    CT Angiogram Coronary-Cardiology Interpretation    metoprolol tartrate (LOPRESSOR) tablet 200 mg    metoprolol tartrate (LOPRESSOR) tablet 150 mg    metoprolol tartrate (LOPRESSOR) tablet 100 mg    metoprolol tartrate (LOPRESSOR) tablet 50 mg    metoprolol tartrate (LOPRESSOR) tablet 25 mg    metoprolol tartrate (LOPRESSOR) tablet 50 mg    metoprolol tartrate (LOPRESSOR) injection 5 mg    nitroglycerin (NITROSTAT) SL tablet 0.4 mg    nitroglycerin (NITROSTAT) SL tablet 0.8 mg    ivabradine HCl (CORLANOR) tablet 15 mg    No Caffeine or Nicotine 4 Hours Prior to CTA Appointment    Nothing to Eat or Drink 4 Hours Prior to CTA Appointment    Do Not Take " Phosphodiasterase Inhibitors in the 72 Hours Prior to Coronary CTA    Obtain Informed Consent - Computed Tomography Angiography of Chest - Angiogram of Coronary Arteries    Vital Signs Upon Arrival    Cardiac Monitoring    Verify NPO Status - Patient to Be NPO at Least 4 Hours Prior to CTA    Notify CT After Administration of metoprolol tartrate (LOPRESSOR) tablet    Notify Provider If Total Metoprolol Given Equals 300mg & Heart Rate Not At Goal    Notify Provider Prior to Administration of Nitroglycerin if Patient SBP <80    POC Creatinine    Insert Peripheral IV    Saline Lock & Maintain IV Access    sodium chloride 0.9 % flush 10 mL    sodium chloride 0.9 % flush 10 mL    sodium chloride 0.9 % infusion 40 mL    Vital Signs - See Instructions    Hold Medication Metformin (Glucophage, Glucophage XR, Fortament, Glumetza);  Metglip (metformin/glipizide);  Glucovance (metformin/glyburide); Avandamet (metformin/rosiglitazone)    Patient May Discharge Home After Procedure Complete (If Stable)    No Metoprolol Prescription Needed    Lipid Panel    Comprehensive Metabolic Panel    CBC & Differential    Magnesium    TSH    Uric Acid    proBNP    High Sensitivity Troponin T    Apolipoprotein B    Lipoprotein A (LPA)      2. S/P MVR (mitral valve repair)  CT Angiogram Coronary    CT Angiogram Coronary-Cardiology Interpretation    metoprolol tartrate (LOPRESSOR) tablet 200 mg    metoprolol tartrate (LOPRESSOR) tablet 150 mg    metoprolol tartrate (LOPRESSOR) tablet 100 mg    metoprolol tartrate (LOPRESSOR) tablet 50 mg    metoprolol tartrate (LOPRESSOR) tablet 25 mg    metoprolol tartrate (LOPRESSOR) tablet 50 mg    metoprolol tartrate (LOPRESSOR) injection 5 mg    nitroglycerin (NITROSTAT) SL tablet 0.4 mg    nitroglycerin (NITROSTAT) SL tablet 0.8 mg    ivabradine HCl (CORLANOR) tablet 15 mg    No Caffeine or Nicotine 4 Hours Prior to CTA Appointment    Nothing to Eat or Drink 4 Hours Prior to CTA Appointment    Do Not Take  Phosphodiasterase Inhibitors in the 72 Hours Prior to Coronary CTA    Obtain Informed Consent - Computed Tomography Angiography of Chest - Angiogram of Coronary Arteries    Vital Signs Upon Arrival    Cardiac Monitoring    Verify NPO Status - Patient to Be NPO at Least 4 Hours Prior to CTA    Notify CT After Administration of metoprolol tartrate (LOPRESSOR) tablet    Notify Provider If Total Metoprolol Given Equals 300mg & Heart Rate Not At Goal    Notify Provider Prior to Administration of Nitroglycerin if Patient SBP <80    POC Creatinine    Insert Peripheral IV    Saline Lock & Maintain IV Access    sodium chloride 0.9 % flush 10 mL    sodium chloride 0.9 % flush 10 mL    sodium chloride 0.9 % infusion 40 mL    Vital Signs - See Instructions    Hold Medication Metformin (Glucophage, Glucophage XR, Fortament, Glumetza);  Metglip (metformin/glipizide);  Glucovance (metformin/glyburide); Avandamet (metformin/rosiglitazone)    Patient May Discharge Home After Procedure Complete (If Stable)    No Metoprolol Prescription Needed      3. Cardiomyopathy, hypertrophic, primary familial  CT Angiogram Coronary    CT Angiogram Coronary-Cardiology Interpretation    metoprolol tartrate (LOPRESSOR) tablet 200 mg    metoprolol tartrate (LOPRESSOR) tablet 150 mg    metoprolol tartrate (LOPRESSOR) tablet 100 mg    metoprolol tartrate (LOPRESSOR) tablet 50 mg    metoprolol tartrate (LOPRESSOR) tablet 25 mg    metoprolol tartrate (LOPRESSOR) tablet 50 mg    metoprolol tartrate (LOPRESSOR) injection 5 mg    nitroglycerin (NITROSTAT) SL tablet 0.4 mg    nitroglycerin (NITROSTAT) SL tablet 0.8 mg    ivabradine HCl (CORLANOR) tablet 15 mg    No Caffeine or Nicotine 4 Hours Prior to CTA Appointment    Nothing to Eat or Drink 4 Hours Prior to CTA Appointment    Do Not Take Phosphodiasterase Inhibitors in the 72 Hours Prior to Coronary CTA    Obtain Informed Consent - Computed Tomography Angiography of Chest - Angiogram of Coronary Arteries     Vital Signs Upon Arrival    Cardiac Monitoring    Verify NPO Status - Patient to Be NPO at Least 4 Hours Prior to CTA    Notify CT After Administration of metoprolol tartrate (LOPRESSOR) tablet    Notify Provider If Total Metoprolol Given Equals 300mg & Heart Rate Not At Goal    Notify Provider Prior to Administration of Nitroglycerin if Patient SBP <80    POC Creatinine    Insert Peripheral IV    Saline Lock & Maintain IV Access    sodium chloride 0.9 % flush 10 mL    sodium chloride 0.9 % flush 10 mL    sodium chloride 0.9 % infusion 40 mL    Vital Signs - See Instructions    Hold Medication Metformin (Glucophage, Glucophage XR, Fortament, Glumetza);  Metglip (metformin/glipizide);  Glucovance (metformin/glyburide); Avandamet (metformin/rosiglitazone)    Patient May Discharge Home After Procedure Complete (If Stable)    No Metoprolol Prescription Needed      4. Abnormal result of cardiovascular function study, unspecified  CT Angiogram Coronary      5. Other chest pain  CT Angiogram Coronary-Cardiology Interpretation      6. Mixed hyperlipidemia  Lipid Panel    Comprehensive Metabolic Panel    CBC & Differential    Magnesium    TSH    Uric Acid    proBNP    High Sensitivity Troponin T    Apolipoprotein B    Lipoprotein A (LPA)      7. LBBB (left bundle branch block)  Lipid Panel    Comprehensive Metabolic Panel    CBC & Differential    Magnesium    TSH    Uric Acid    proBNP    High Sensitivity Troponin T    Apolipoprotein B    Lipoprotein A (LPA)      8. Tobacco use  Lipid Panel    Comprehensive Metabolic Panel    CBC & Differential    Magnesium    TSH    Uric Acid    proBNP    High Sensitivity Troponin T    Apolipoprotein B    Lipoprotein A (LPA)                 Hypertrophic obstructive cardiomyopathy- s/p septal myectomy with plication of A2 of the mitral leaflet and resection of the accessory papillary muscle head to A3 of the mitral valve done 12/29/2010 at [facility].  Has no LVOT obstruction.  On  metoprolol and aspirin for this.  Mitral insufficiency, mild to moderate  Hyperlipidemia, on atorvastatin  Tobacco use, advised cessation  Right femoral neck fracture after a fall in [place], surgery was not required, status post left hip surgery.  Anxiety, felt in her chest, with mild exertional dyspnea, consider coronary CTA looking for coronary artery disease.  LBBB, chronic    Plan:       No medication changes, set testing as noted above, also needs lipid panel.  See me in 1 year.  Will need to repeat echocardiogram next year.

## 2024-10-25 RX ORDER — METOPROLOL SUCCINATE 25 MG/1
12.5 TABLET, EXTENDED RELEASE ORAL NIGHTLY
Qty: 45 TABLET | Refills: 3 | Status: SHIPPED | OUTPATIENT
Start: 2024-10-25

## 2024-10-25 NOTE — TELEPHONE ENCOUNTER
Pt called to have rx for Metoprolol sent to the correct pharmacy. She uses [facility] on [address]    Jg cma  10/25/24

## 2024-11-06 RX ORDER — ATORVASTATIN CALCIUM 20 MG/1
20 TABLET, FILM COATED ORAL
Qty: 90 TABLET | Refills: 3 | Status: SHIPPED | OUTPATIENT
Start: 2024-11-06

## 2024-11-06 NOTE — TELEPHONE ENCOUNTER
Refill Protocol Failed,   Requesting Atorvastatin     LOV w/ you 10/22/24  FU w/ you 10/22/24  Last Labs- Lipid panel 03/19/21 You ordered labs at last office visit, not done yet.    Please review and sign.    Deja AWAN CMA

## 2024-11-13 ENCOUNTER — TELEPHONE (OUTPATIENT)
Dept: CARDIOLOGY | Facility: CLINIC | Age: 64
End: 2024-11-13
Payer: MEDICARE

## 2024-11-13 ENCOUNTER — LAB (OUTPATIENT)
Dept: LAB | Facility: HOSPITAL | Age: 64
End: 2024-11-13
Payer: MEDICARE

## 2024-11-13 DIAGNOSIS — I42.2 CARDIOMYOPATHY, HYPERTROPHIC, PRIMARY FAMILIAL: ICD-10-CM

## 2024-11-13 DIAGNOSIS — R79.89 ELEVATED TROPONIN: ICD-10-CM

## 2024-11-13 DIAGNOSIS — Z72.0 TOBACCO USE: ICD-10-CM

## 2024-11-13 DIAGNOSIS — R06.09 DYSPNEA ON EXERTION: Primary | ICD-10-CM

## 2024-11-13 DIAGNOSIS — R06.09 DYSPNEA ON EXERTION: ICD-10-CM

## 2024-11-13 DIAGNOSIS — E78.2 MIXED HYPERLIPIDEMIA: ICD-10-CM

## 2024-11-13 DIAGNOSIS — I44.7 LBBB (LEFT BUNDLE BRANCH BLOCK): ICD-10-CM

## 2024-11-13 LAB
ALBUMIN SERPL-MCNC: 4.1 G/DL (ref 3.5–5.2)
ALBUMIN/GLOB SERPL: 2.4 G/DL
ALP SERPL-CCNC: 88 U/L (ref 39–117)
ALT SERPL W P-5'-P-CCNC: 11 U/L (ref 1–33)
ANION GAP SERPL CALCULATED.3IONS-SCNC: 7 MMOL/L (ref 5–15)
AST SERPL-CCNC: 18 U/L (ref 1–32)
BASOPHILS # BLD AUTO: 0.06 10*3/MM3 (ref 0–0.2)
BASOPHILS NFR BLD AUTO: 0.8 % (ref 0–1.5)
BILIRUB SERPL-MCNC: 0.5 MG/DL (ref 0–1.2)
BUN SERPL-MCNC: 14 MG/DL (ref 8–23)
BUN/CREAT SERPL: 24.6 (ref 7–25)
CALCIUM SPEC-SCNC: 9.5 MG/DL (ref 8.6–10.5)
CHLORIDE SERPL-SCNC: 108 MMOL/L (ref 98–107)
CHOLEST SERPL-MCNC: 133 MG/DL (ref 0–200)
CO2 SERPL-SCNC: 28 MMOL/L (ref 22–29)
CREAT SERPL-MCNC: 0.57 MG/DL (ref 0.57–1)
DEPRECATED RDW RBC AUTO: 47.8 FL (ref 37–54)
EGFRCR SERPLBLD CKD-EPI 2021: 101 ML/MIN/1.73
EOSINOPHIL # BLD AUTO: 0.23 10*3/MM3 (ref 0–0.4)
EOSINOPHIL NFR BLD AUTO: 2.9 % (ref 0.3–6.2)
ERYTHROCYTE [DISTWIDTH] IN BLOOD BY AUTOMATED COUNT: 13.4 % (ref 12.3–15.4)
GLOBULIN UR ELPH-MCNC: 1.7 GM/DL
GLUCOSE SERPL-MCNC: 89 MG/DL (ref 65–99)
HCT VFR BLD AUTO: 41.5 % (ref 34–46.6)
HDLC SERPL-MCNC: 46 MG/DL (ref 40–60)
HGB BLD-MCNC: 13 G/DL (ref 12–15.9)
IMM GRANULOCYTES # BLD AUTO: 0.03 10*3/MM3 (ref 0–0.05)
IMM GRANULOCYTES NFR BLD AUTO: 0.4 % (ref 0–0.5)
LDLC SERPL CALC-MCNC: 67 MG/DL (ref 0–100)
LDLC/HDLC SERPL: 1.4 {RATIO}
LYMPHOCYTES # BLD AUTO: 2.29 10*3/MM3 (ref 0.7–3.1)
LYMPHOCYTES NFR BLD AUTO: 28.9 % (ref 19.6–45.3)
MAGNESIUM SERPL-MCNC: 2.2 MG/DL (ref 1.6–2.4)
MCH RBC QN AUTO: 30.2 PG (ref 26.6–33)
MCHC RBC AUTO-ENTMCNC: 31.3 G/DL (ref 31.5–35.7)
MCV RBC AUTO: 96.3 FL (ref 79–97)
MONOCYTES # BLD AUTO: 0.57 10*3/MM3 (ref 0.1–0.9)
MONOCYTES NFR BLD AUTO: 7.2 % (ref 5–12)
NEUTROPHILS NFR BLD AUTO: 4.74 10*3/MM3 (ref 1.7–7)
NEUTROPHILS NFR BLD AUTO: 59.8 % (ref 42.7–76)
NRBC BLD AUTO-RTO: 0 /100 WBC (ref 0–0.2)
NT-PROBNP SERPL-MCNC: 2109 PG/ML (ref 0–900)
PLATELET # BLD AUTO: 291 10*3/MM3 (ref 140–450)
PMV BLD AUTO: 9.8 FL (ref 6–12)
POTASSIUM SERPL-SCNC: 4.7 MMOL/L (ref 3.5–5.2)
PROT SERPL-MCNC: 5.8 G/DL (ref 6–8.5)
RBC # BLD AUTO: 4.31 10*6/MM3 (ref 3.77–5.28)
SODIUM SERPL-SCNC: 143 MMOL/L (ref 136–145)
TRIGL SERPL-MCNC: 112 MG/DL (ref 0–150)
TROPONIN T SERPL HS-MCNC: 101 NG/L
TSH SERPL DL<=0.05 MIU/L-ACNC: 1.26 UIU/ML (ref 0.27–4.2)
URATE SERPL-MCNC: 2.6 MG/DL (ref 2.4–5.7)
VLDLC SERPL-MCNC: 20 MG/DL (ref 5–40)
WBC NRBC COR # BLD AUTO: 7.92 10*3/MM3 (ref 3.4–10.8)

## 2024-11-13 PROCEDURE — 83735 ASSAY OF MAGNESIUM: CPT

## 2024-11-13 PROCEDURE — 83695 ASSAY OF LIPOPROTEIN(A): CPT

## 2024-11-13 PROCEDURE — 82172 ASSAY OF APOLIPOPROTEIN: CPT

## 2024-11-13 PROCEDURE — 84484 ASSAY OF TROPONIN QUANT: CPT

## 2024-11-13 PROCEDURE — 84443 ASSAY THYROID STIM HORMONE: CPT

## 2024-11-13 PROCEDURE — 80061 LIPID PANEL: CPT

## 2024-11-13 PROCEDURE — 85025 COMPLETE CBC W/AUTO DIFF WBC: CPT

## 2024-11-13 PROCEDURE — 83880 ASSAY OF NATRIURETIC PEPTIDE: CPT

## 2024-11-13 PROCEDURE — 80053 COMPREHEN METABOLIC PANEL: CPT

## 2024-11-13 PROCEDURE — 36415 COLL VENOUS BLD VENIPUNCTURE: CPT

## 2024-11-13 PROCEDURE — 84550 ASSAY OF BLOOD/URIC ACID: CPT

## 2024-11-13 NOTE — TELEPHONE ENCOUNTER
Called her about laboratory results.  Her troponin and proBNP are both elevated in the setting of exertional dyspnea, chronically abnormal ECG and hypertrophic cardiomyopathy.  She also smokes cigarettes.  Please set up cardiac catheterization to be done this week.  I spoke with her about this.  She is amenable to proceed.

## 2024-11-14 LAB — LPA SERPL-SCNC: 12.2 NMOL/L

## 2024-11-15 ENCOUNTER — HOSPITAL ENCOUNTER (OUTPATIENT)
Facility: HOSPITAL | Age: 64
Setting detail: HOSPITAL OUTPATIENT SURGERY
Discharge: HOME | End: 2024-11-15
Attending: INTERNAL MEDICINE | Admitting: INTERNAL MEDICINE
Payer: MEDICARE

## 2024-11-15 VITALS
OXYGEN SATURATION: 98 % | HEIGHT: 64 IN | DIASTOLIC BLOOD PRESSURE: 70 MMHG | TEMPERATURE: 97.6 F | SYSTOLIC BLOOD PRESSURE: 101 MMHG | WEIGHT: 132 LBS | HEART RATE: 61 BPM | RESPIRATION RATE: 16 BRPM | BODY MASS INDEX: 22.53 KG/M2

## 2024-11-15 DIAGNOSIS — R79.89 ELEVATED TROPONIN: ICD-10-CM

## 2024-11-15 DIAGNOSIS — Z98.890 S/P MVR (MITRAL VALVE REPAIR): ICD-10-CM

## 2024-11-15 DIAGNOSIS — Z72.0 TOBACCO USE: ICD-10-CM

## 2024-11-15 DIAGNOSIS — I42.2 CARDIOMYOPATHY, HYPERTROPHIC, PRIMARY FAMILIAL: ICD-10-CM

## 2024-11-15 DIAGNOSIS — R06.09 DYSPNEA ON EXERTION: ICD-10-CM

## 2024-11-15 LAB
APO B SERPL-MCNC: 64 MG/DL
HCT VFR BLDA CALC: 67 % (ref 38–51)
HGB BLDA-MCNC: 22.8 G/DL (ref 12–17)
SAO2 % BLDA: 64 % (ref 95–98)

## 2024-11-15 PROCEDURE — 85014 HEMATOCRIT: CPT

## 2024-11-15 PROCEDURE — 25010000002 LIDOCAINE 2% SOLUTION: Performed by: INTERNAL MEDICINE

## 2024-11-15 PROCEDURE — C1769 GUIDE WIRE: HCPCS | Performed by: INTERNAL MEDICINE

## 2024-11-15 PROCEDURE — 93460 R&L HRT ART/VENTRICLE ANGIO: CPT | Performed by: INTERNAL MEDICINE

## 2024-11-15 PROCEDURE — 82810 BLOOD GASES O2 SAT ONLY: CPT

## 2024-11-15 PROCEDURE — C1894 INTRO/SHEATH, NON-LASER: HCPCS | Performed by: INTERNAL MEDICINE

## 2024-11-15 PROCEDURE — 25010000002 FENTANYL CITRATE (PF) 50 MCG/ML SOLUTION: Performed by: INTERNAL MEDICINE

## 2024-11-15 PROCEDURE — 25810000003 SODIUM CHLORIDE 0.9 % SOLUTION: Performed by: INTERNAL MEDICINE

## 2024-11-15 PROCEDURE — 85018 HEMOGLOBIN: CPT

## 2024-11-15 PROCEDURE — 25510000001 IOPAMIDOL PER 1 ML: Performed by: INTERNAL MEDICINE

## 2024-11-15 PROCEDURE — 25010000002 MIDAZOLAM PER 1 MG: Performed by: INTERNAL MEDICINE

## 2024-11-15 PROCEDURE — 25010000002 HEPARIN (PORCINE) PER 1000 UNITS: Performed by: INTERNAL MEDICINE

## 2024-11-15 RX ORDER — SODIUM CHLORIDE 0.9 % (FLUSH) 0.9 %
10 SYRINGE (ML) INJECTION AS NEEDED
Status: DISCONTINUED | OUTPATIENT
Start: 2024-11-15 | End: 2024-11-15 | Stop reason: HOSPADM

## 2024-11-15 RX ORDER — IVABRADINE 5 MG/1
15 TABLET, FILM COATED ORAL ONCE
Status: DISCONTINUED | OUTPATIENT
Start: 2024-11-15 | End: 2024-11-15 | Stop reason: HOSPADM

## 2024-11-15 RX ORDER — VERAPAMIL HYDROCHLORIDE 2.5 MG/ML
INJECTION, SOLUTION INTRAVENOUS
Status: DISCONTINUED | OUTPATIENT
Start: 2024-11-15 | End: 2024-11-15 | Stop reason: HOSPADM

## 2024-11-15 RX ORDER — SODIUM CHLORIDE 9 MG/ML
50 INJECTION, SOLUTION INTRAVENOUS CONTINUOUS
Status: DISCONTINUED | OUTPATIENT
Start: 2024-11-15 | End: 2024-11-15 | Stop reason: HOSPADM

## 2024-11-15 RX ORDER — SODIUM CHLORIDE 9 MG/ML
75 INJECTION, SOLUTION INTRAVENOUS CONTINUOUS
Status: DISCONTINUED | OUTPATIENT
Start: 2024-11-15 | End: 2024-11-15 | Stop reason: HOSPADM

## 2024-11-15 RX ORDER — MIDAZOLAM HYDROCHLORIDE 1 MG/ML
INJECTION, SOLUTION INTRAMUSCULAR; INTRAVENOUS
Status: DISCONTINUED | OUTPATIENT
Start: 2024-11-15 | End: 2024-11-15 | Stop reason: HOSPADM

## 2024-11-15 RX ORDER — METOPROLOL TARTRATE 25 MG/1
150 TABLET, FILM COATED ORAL ONCE
Status: DISCONTINUED | OUTPATIENT
Start: 2024-11-15 | End: 2024-11-15 | Stop reason: HOSPADM

## 2024-11-15 RX ORDER — LIDOCAINE HYDROCHLORIDE 20 MG/ML
INJECTION, SOLUTION INFILTRATION; PERINEURAL
Status: DISCONTINUED | OUTPATIENT
Start: 2024-11-15 | End: 2024-11-15 | Stop reason: HOSPADM

## 2024-11-15 RX ORDER — METOPROLOL TARTRATE 25 MG/1
25 TABLET, FILM COATED ORAL ONCE
Status: DISCONTINUED | OUTPATIENT
Start: 2024-11-15 | End: 2024-11-15 | Stop reason: HOSPADM

## 2024-11-15 RX ORDER — METOPROLOL TARTRATE 25 MG/1
50 TABLET, FILM COATED ORAL ONCE
Status: DISCONTINUED | OUTPATIENT
Start: 2024-11-15 | End: 2024-11-15 | Stop reason: HOSPADM

## 2024-11-15 RX ORDER — SODIUM CHLORIDE 0.9 % (FLUSH) 0.9 %
10 SYRINGE (ML) INJECTION EVERY 12 HOURS SCHEDULED
Status: DISCONTINUED | OUTPATIENT
Start: 2024-11-15 | End: 2024-11-15 | Stop reason: HOSPADM

## 2024-11-15 RX ORDER — METOPROLOL TARTRATE 1 MG/ML
5 INJECTION, SOLUTION INTRAVENOUS
Status: DISCONTINUED | OUTPATIENT
Start: 2024-11-15 | End: 2024-11-15 | Stop reason: HOSPADM

## 2024-11-15 RX ORDER — NITROGLYCERIN 0.4 MG/1
0.4 TABLET SUBLINGUAL
Status: DISCONTINUED | OUTPATIENT
Start: 2024-11-15 | End: 2024-11-15 | Stop reason: HOSPADM

## 2024-11-15 RX ORDER — HEPARIN SODIUM 1000 [USP'U]/ML
INJECTION, SOLUTION INTRAVENOUS; SUBCUTANEOUS
Status: DISCONTINUED | OUTPATIENT
Start: 2024-11-15 | End: 2024-11-15 | Stop reason: HOSPADM

## 2024-11-15 RX ORDER — NITROGLYCERIN 0.4 MG/1
0.8 TABLET SUBLINGUAL
Status: DISCONTINUED | OUTPATIENT
Start: 2024-11-15 | End: 2024-11-15 | Stop reason: HOSPADM

## 2024-11-15 RX ORDER — METOPROLOL TARTRATE 50 MG
50 TABLET ORAL
Status: DISCONTINUED | OUTPATIENT
Start: 2024-11-15 | End: 2024-11-15 | Stop reason: HOSPADM

## 2024-11-15 RX ORDER — SODIUM CHLORIDE 9 MG/ML
40 INJECTION, SOLUTION INTRAVENOUS AS NEEDED
Status: ACTIVE | OUTPATIENT
Start: 2024-11-15 | End: 2024-11-15

## 2024-11-15 RX ORDER — ACETAMINOPHEN 325 MG/1
650 TABLET ORAL EVERY 4 HOURS PRN
Status: DISCONTINUED | OUTPATIENT
Start: 2024-11-15 | End: 2024-11-15 | Stop reason: HOSPADM

## 2024-11-15 RX ORDER — METOPROLOL TARTRATE 25 MG/1
100 TABLET, FILM COATED ORAL ONCE
Status: DISCONTINUED | OUTPATIENT
Start: 2024-11-15 | End: 2024-11-15 | Stop reason: HOSPADM

## 2024-11-15 RX ORDER — METOPROLOL TARTRATE 25 MG/1
200 TABLET, FILM COATED ORAL ONCE
Status: DISCONTINUED | OUTPATIENT
Start: 2024-11-15 | End: 2024-11-15 | Stop reason: HOSPADM

## 2024-11-15 RX ORDER — IOPAMIDOL 755 MG/ML
INJECTION, SOLUTION INTRAVASCULAR
Status: DISCONTINUED | OUTPATIENT
Start: 2024-11-15 | End: 2024-11-15 | Stop reason: HOSPADM

## 2024-11-15 RX ORDER — ONDANSETRON 4 MG/1
4 TABLET, ORALLY DISINTEGRATING ORAL EVERY 6 HOURS PRN
Status: DISCONTINUED | OUTPATIENT
Start: 2024-11-15 | End: 2024-11-15 | Stop reason: HOSPADM

## 2024-11-15 RX ORDER — FENTANYL CITRATE 50 UG/ML
INJECTION, SOLUTION INTRAMUSCULAR; INTRAVENOUS
Status: DISCONTINUED | OUTPATIENT
Start: 2024-11-15 | End: 2024-11-15 | Stop reason: HOSPADM

## 2024-11-15 RX ORDER — ONDANSETRON 2 MG/ML
4 INJECTION INTRAMUSCULAR; INTRAVENOUS EVERY 6 HOURS PRN
Status: DISCONTINUED | OUTPATIENT
Start: 2024-11-15 | End: 2024-11-15 | Stop reason: HOSPADM

## 2024-11-15 RX ADMIN — SODIUM CHLORIDE 75 ML/HR: 9 INJECTION, SOLUTION INTRAVENOUS at 09:15

## 2024-11-15 NOTE — DISCHARGE INSTRUCTIONS
[facility]  [address] [place] [address]    Coronary Angiogram (Radial/Ulnar Approach) After Care    Refer to this sheet in the next few weeks. These instructions provide you with information on caring for yourself after your procedure. Your caregiver may also give you more specific instructions. Your treatment has been planned according to current medical practices, but problems sometimes occur. Call your caregiver if you have any problems or questions after your procedure.    Home Care Instructions:  You may shower the day after the procedure. Remove the bandage (dressing) and gently wash the site with plain soap and water. Gently pat the site dry. You may apply a band aid daily for 2 days if desired.    Do not apply powder or lotion to the site.  Do not submerge the affected site in water for 3 to 5 days or until the site is completely healed.   Do not lift, push or pull anything over 5 pounds for 5 days after your procedure or as directed by your physician.  As a reference, a gallon of milk weighs 8 pounds.   Inspect the site at least twice daily. You may notice some bruising at the site and it may be tender for 1 to 2 weeks.     Increase your fluid intake for the next 2 days.    Keep arm elevated for 24 hours. For the remainder of the day, keep your arm in “Pledge of Allegiance” position when up and about.     You may drive 24 hours after the procedure unless otherwise instructed by your caregiver.  Do not operate machinery or power tools for 24 hours.  A responsible adult should be with you for the first 24 hours after you arrive home. Do not make any important legal decisions or sign legal papers for 24 hours.  Do not drink alcohol for 24 hours.    Metformin or any medications containing Metformin should not be taken for 48 hours after your procedure.      Call Your Doctor if:   You have unusual pain at the radial/ulnar (wrist) site.  You have redness, warmth, swelling, or pain at the  radial/ulnar (wrist) site.  You have drainage (other than a small amount of blood on the dressing).  `You have chills or a fever > 101.  Your arm becomes pale or dark, cool, tingly, or numb.  You develop chest pain, shortness of breath, feel faint or pass out.    You have heavy bleeding from the site, hold pressure on the site for 20 minutes.  If the bleeding stops, apply a fresh bandage and call your cardiologist.  However, if you        continue to have bleeding, call 911 and continue to apply pressure to the site.   You have any symptoms of a stroke.  Remember BE FAST  B-balance. Sudden trouble walking or loss of balance.  E-eyes.  Sudden changes in how you see or a sudden onset of a very bad headache.   F-face. Sudden weakness or loss of feeling of the face or facial droop on one side.   A-arms Sudden weakness or numbness in one arm.  One arm drifts down if they are both held out in front of you. This happens suddenly and usually on one side of the body.   S-speech.  Sudden trouble speaking, slurred speech or trouble understanding what are saying.   T-time  Time to call emergency services.  Write down the symptoms and the time they started.

## 2024-11-15 NOTE — Clinical Note
Hemostasis started on the right radial artery. Manual pressure applied to vessel. Manual pressure was held by AM RTR. Manual pressure was held for 5 min. Hemostasis achieved successfully.

## 2024-11-15 NOTE — Clinical Note
Hemostasis started on the right brachial vein. Manual pressure applied to vessel. Manual pressure was held by  RTR. Manual pressure was held for 5 min. Hemostasis achieved successfully.

## 2024-11-15 NOTE — Clinical Note
Prepped: right groin, right brachial and Right Wrist. Prepped with: Hibiclens. The site was clipped.

## 2024-11-19 LAB
HCT VFR BLDA CALC: 37 % (ref 38–51)
HCT VFR BLDA CALC: 37 % (ref 38–51)
HGB BLDA-MCNC: 12.6 G/DL (ref 12–17)
HGB BLDA-MCNC: 12.6 G/DL (ref 12–17)
SAO2 % BLDA: 63 % (ref 95–98)
SAO2 % BLDA: 97 % (ref 95–98)

## 2024-11-21 ENCOUNTER — OFFICE VISIT (OUTPATIENT)
Dept: CARDIOLOGY | Facility: CLINIC | Age: 64
End: 2024-11-21
Payer: MEDICARE

## 2024-11-21 VITALS
SYSTOLIC BLOOD PRESSURE: 115 MMHG | BODY MASS INDEX: 22.2 KG/M2 | HEIGHT: 64 IN | OXYGEN SATURATION: 97 % | WEIGHT: 130 LBS | DIASTOLIC BLOOD PRESSURE: 80 MMHG

## 2024-11-21 DIAGNOSIS — I34.0 NONRHEUMATIC MITRAL VALVE REGURGITATION: ICD-10-CM

## 2024-11-21 DIAGNOSIS — F41.9 ANXIETY: ICD-10-CM

## 2024-11-21 DIAGNOSIS — I42.2 CARDIOMYOPATHY, HYPERTROPHIC, PRIMARY FAMILIAL: ICD-10-CM

## 2024-11-21 DIAGNOSIS — I44.7 LBBB (LEFT BUNDLE BRANCH BLOCK): ICD-10-CM

## 2024-11-21 DIAGNOSIS — R79.89 ELEVATED TROPONIN: Primary | ICD-10-CM

## 2024-11-21 DIAGNOSIS — E78.00 ELEVATED CHOLESTEROL: ICD-10-CM

## 2024-11-21 PROCEDURE — 99214 OFFICE O/P EST MOD 30 MIN: CPT | Performed by: FAMILY MEDICINE

## 2024-11-21 RX ORDER — ALPRAZOLAM 0.25 MG/1
0.25 TABLET ORAL 2 TIMES DAILY PRN
Qty: 10 TABLET | Refills: 0 | Status: SHIPPED | OUTPATIENT
Start: 2024-11-21

## 2024-11-21 NOTE — PROGRESS NOTES
Date of Office Visit: 2024  Encounter Provider: GERMAINE De Anda  Place of Service: [facility] CARDIOLOGY  Established cardiologist: Jacqui Palencia MD  Patient Name: Shira Cortes  :1960      Patient ID:  Shira Cortes is a 64 y.o. 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  Echocardiogram showed marked septal hypertrophy with systolic anterior movement of the anterior mitral leaflet and severe LV outflow tract obstruction with a peak gradient of 72 mmHg.  Grade 2 diastolic dysfunction and hyperdynamic small sized LV.  There was also mild mitral insufficiency.  C in 2010 with normal coronaries and hypertrophic cardiomyopathy.  Holter monitor done at that time with frequent PVCs.    She was referred to [facility] for septal myectomy with mitral valve leaflet resection of the accessory papillary muscle head to the A3 segment of the mitral valve which was performed 2010.  Cardiac MRI confirmed hypertrophic cardiomyopathy.  Postop echo showed EF 65%, no systolic anterior movement of the mitral leaflet and mild mitral insufficiency.    Echocardiogram done 2023 showed moderate septal asymmetric hypertrophy, hypokinetic apical septum and mid anterior septal wall, grade 2 diastolic dysfunction, severe left atrial enlargement, global longitudinal strain of -13.1%, low normal LVEF, no LVOT gradient, mild mitral insufficiency with systolic anterior motion of the chordal apparatus, no pericardial effusion.     Pyrophosphate study done 10/30/2023 was not suggestive of ATTR amyloidosis.     She saw Dr. Palencia in the office  "10/22/2024 and had mild MCCALLUM and precordial discomfort with anxiety which was similar to her symptom prior to septal myectomy. She was sent for lab work and troponin (101) and pro BNP (2109) returned elevated. She underwent LHC on 11/15/24 which showed normal coronaries, low normal right and left-sided filling pressures, no pulmonary hypertension,Resting outflow tract gradient 5 mmHg. Post PVC gradient 20 mmHg without significant increase in LV pressure, Decreased cardiac index.     Today Renu is here for follow up after LHC. Her symptoms are really unchanged with mild MCCALLUM and infrequent precordial discomfort.  She gets very nervous when she feels symptoms, has a lot of anxiety about her own diagnoses as well as her family history, she is smoking again due to the anxiety.  Smoking also makes her feel anxious.  She would like to see  at [facility] in [place].     Current Outpatient Medications on File Prior to Visit   Medication Sig Dispense Refill    alendronate (FOSAMAX) 70 MG tablet Take 1 tablet by mouth Every 7 (Seven) Days.      aspirin 81 MG tablet Take 1 tablet by mouth Daily.      atorvastatin (LIPITOR) 20 MG tablet TAKE 1 TABLET BY MOUTH EVERYDAY AT BEDTIME 90 tablet 3    metoprolol succinate XL (TOPROL-XL) 25 MG 24 hr tablet Take 0.5 tablets by mouth Every Night. 45 tablet 3    Multiple Vitamins-Minerals (multivitamin and minerals) liquid liquid Take  by mouth Daily.       No current facility-administered medications on file prior to visit.         Procedures    ECG 12 Lead    Date/Time: 11/25/2024 4:06 PM  Performed by: Julia Renner APRN    Authorized by: Julia Renner APRN  Comparison: compared with previous ECG from 10/22/2024  Rhythm: sinus rhythm  BPM: 78  Conduction: left bundle branch block              Objective:      Vitals:    11/21/24 1436   BP: 115/80   SpO2: 97%   Weight: 59 kg (130 lb)   Height: 162.6 cm (64\")     Body mass index is 22.31 kg/m².  Wt Readings from " "Last 3 Encounters:   11/21/24 59 kg (130 lb)   11/15/24 59.9 kg (132 lb)   10/22/24 59.4 kg (131 lb)     Constitutional:       General: Not in acute distress.     Appearance: Not diaphoretic.   Neck:      Vascular: No carotid bruit or JVD.   Pulmonary:      Effort: Pulmonary effort is normal.      Breath sounds: Normal breath sounds.   Cardiovascular:      Normal rate. Regular rhythm.      Murmurs: There is a grade 3/6 holosystolic murmur at the LLSB.      No gallop.    Pulses:     Intact distal pulses.      Carotid: 2+ bilaterally.     Radial: 2+ bilaterally.     Dorsalis pedis: 2+ bilaterally.     Posterior tibial: 2+ bilaterally.  Edema:     Peripheral edema absent.       Lab Review:      Lab Results   Component Value Date    TSH 1.260 11/13/2024       Lab Results   Component Value Date    CHOL 133 11/13/2024     Lab Results   Component Value Date    TRIG 112 11/13/2024     Lab Results   Component Value Date    HDL 46 11/13/2024     Lab Results   Component Value Date    LDL 67 11/13/2024       Lab Results   Component Value Date    WBC 7.92 11/13/2024    HGB 12.6 11/15/2024    HCT 37 (L) 11/15/2024    MCV 96.3 11/13/2024     11/13/2024       Lab Results   Component Value Date    GLUCOSE 89 11/13/2024    BUN 14 11/13/2024    CREATININE 0.57 11/13/2024    EGFR 101.0 11/13/2024    BCR 24.6 11/13/2024    K 4.7 11/13/2024    CO2 28.0 11/13/2024    CALCIUM 9.5 11/13/2024    ALBUMIN 4.1 11/13/2024    BILITOT 0.5 11/13/2024    AST 18 11/13/2024    ALT 11 11/13/2024       No results found for: \"HGBA1C\"    Assessment:     1. Elevated troponin    2. Cardiomyopathy, hypertrophic, primary familial    3. Nonrheumatic mitral valve regurgitation    4. LBBB (left bundle branch block)    5. Elevated cholesterol    6. Anxiety      Elevated high-sensitivity troponin of 101 on 11/13/2024.  She did undergo LHC which showed normal coronaries.  Dr. Palencia discussed results with patient and her son today.  We do not have a " history of baseline troponins unsure if perhaps she has been living at this level given her other diagnoses or not.  HOCM s/p septal myectomy with plication of A2 of the mitral leaflet and resection of the accessory papillary muscle head to A3 of the mitral valve done 12/29/2010 at [facility].  Has no LVOT obstruction.  On metoprolol and aspirin for this.  Mitral insufficiency, mild to moderate (mitral valve leaflet resection done 2010)  LBBB chronic   HLD on atorvastatin  Anxiety, felt in her chest, with mild exertional dyspnea -intermittent and stable  Tobacco use, advised cessation  Right femoral neck fracture after a fall in [place], surgery was not required, status post left hip surgery.      Plan:   Dr. Palencia discussed plan with patient and her son today.  I have ordered a cardiac MRI and placed an external referral to [facility] in [place],      At this time she will keep her annual appointments with Dr. Palencia as scheduled and let us know how her visit in [place] goes and if any change to her medical regimen is recommended.     Thank you for allowing me to participate in this patient's care. Please call with any questions or concerns.          Dragon dictation device was utilized in this note.

## 2024-11-25 PROCEDURE — 93000 ELECTROCARDIOGRAM COMPLETE: CPT | Performed by: FAMILY MEDICINE

## 2024-11-26 PROBLEM — I44.7 LBBB (LEFT BUNDLE BRANCH BLOCK): Status: ACTIVE | Noted: 2024-11-26

## 2024-12-10 ENCOUNTER — HOSPITAL ENCOUNTER (OUTPATIENT)
Facility: HOSPITAL | Age: 64
Discharge: HOME | End: 2024-12-10
Admitting: FAMILY MEDICINE
Payer: MEDICARE

## 2024-12-10 PROCEDURE — 25510000002 GADOBENATE DIMEGLUMINE 529 MG/ML SOLUTION: Performed by: FAMILY MEDICINE

## 2024-12-10 PROCEDURE — 75561 CARDIAC MRI FOR MORPH W/DYE: CPT

## 2024-12-10 PROCEDURE — A9577 INJ MULTIHANCE: HCPCS | Performed by: FAMILY MEDICINE

## 2024-12-10 RX ADMIN — GADOBENATE DIMEGLUMINE 12 ML: 529 INJECTION, SOLUTION INTRAVENOUS at 10:44

## 2024-12-12 ENCOUNTER — TELEPHONE (OUTPATIENT)
Dept: CARDIOLOGY | Facility: CLINIC | Age: 64
End: 2024-12-12
Payer: MEDICARE

## 2024-12-19 ENCOUNTER — TELEPHONE (OUTPATIENT)
Dept: CARDIOLOGY | Facility: CLINIC | Age: 64
End: 2024-12-19
Payer: MEDICARE

## 2024-12-19 DIAGNOSIS — I42.2 CARDIOMYOPATHY, HYPERTROPHIC, PRIMARY FAMILIAL: Primary | ICD-10-CM

## 2024-12-19 NOTE — TELEPHONE ENCOUNTER
----- Message from Milady CARDOSO sent at 12/19/2024  9:50 AM EST -----  Rob Dumont,     You just need to place a referral to cardiology and we will get it sent over to Dr. Ceballos.     Thanks,  ----- Message -----  From: Shon Alfredo RegSched Rep  Sent: 12/16/2024  11:02 AM EST  To: Brent Sheets    Has this been taken care of?  ----- Message -----  From: Julia Renner APRN  Sent: 11/25/2024   2:43 PM EST  To: selene [facility] Referral Coordinator    I need to place an external referral to [facility]   To Dr. Jens Ceballos   For HOCM       Is there an order I place in epic to do this?    Virginie Dumont  ----- Message -----  From: Camelia Castillo MA  Sent: 11/25/2024   2:08 PM EST  To: GERMAINE De Anda    Forwarding back to you so it can be sent to the referral pool  ----- Message -----  From: Julia Renner APRN  Sent: 11/22/2024   8:49 AM EST  To: Kirstin Alfredo MA; Camelia Castillo MA    I need to place an external referral to [facility]   To Dr. Jens Ceballos   For HOCM     Can we call their office and do directly? Or is there an external referral order I place in [facility]? I couldn't find one yesterday    Thanks   - - - - - - - - -

## 2025-05-27 ENCOUNTER — HOSPITAL ENCOUNTER (EMERGENCY)
Facility: HOSPITAL | Age: 65
Discharge: HOME | End: 2025-05-27
Attending: EMERGENCY MEDICINE | Admitting: EMERGENCY MEDICINE
Payer: MEDICARE

## 2025-05-27 ENCOUNTER — APPOINTMENT (OUTPATIENT)
Dept: CT IMAGING | Facility: HOSPITAL | Age: 65
End: 2025-05-27
Payer: MEDICARE

## 2025-05-27 VITALS
TEMPERATURE: 98.5 F | SYSTOLIC BLOOD PRESSURE: 115 MMHG | HEART RATE: 57 BPM | DIASTOLIC BLOOD PRESSURE: 72 MMHG | RESPIRATION RATE: 16 BRPM | OXYGEN SATURATION: 96 %

## 2025-05-27 DIAGNOSIS — R10.9 LEFT FLANK PAIN: Primary | ICD-10-CM

## 2025-05-27 LAB
ALBUMIN SERPL-MCNC: 4.8 G/DL (ref 3.5–5.2)
ALBUMIN/GLOB SERPL: 1.8 G/DL
ALP SERPL-CCNC: 91 U/L (ref 39–117)
ALT SERPL W P-5'-P-CCNC: 14 U/L (ref 1–33)
ANION GAP SERPL CALCULATED.3IONS-SCNC: 9.7 MMOL/L (ref 5–15)
AST SERPL-CCNC: 20 U/L (ref 1–32)
BASOPHILS # BLD AUTO: 0.04 10*3/MM3 (ref 0–0.2)
BASOPHILS NFR BLD AUTO: 0.4 % (ref 0–1.5)
BILIRUB SERPL-MCNC: 0.4 MG/DL (ref 0–1.2)
BILIRUB UR QL STRIP: NEGATIVE
BUN SERPL-MCNC: 15 MG/DL (ref 8–23)
BUN/CREAT SERPL: 22.4 (ref 7–25)
CALCIUM SPEC-SCNC: 9.9 MG/DL (ref 8.6–10.5)
CHLORIDE SERPL-SCNC: 104 MMOL/L (ref 98–107)
CLARITY UR: CLEAR
CO2 SERPL-SCNC: 25.3 MMOL/L (ref 22–29)
COLOR UR: YELLOW
CREAT SERPL-MCNC: 0.67 MG/DL (ref 0.57–1)
DEPRECATED RDW RBC AUTO: 47.3 FL (ref 37–54)
EGFRCR SERPLBLD CKD-EPI 2021: 96.5 ML/MIN/1.73
EOSINOPHIL # BLD AUTO: 0.15 10*3/MM3 (ref 0–0.4)
EOSINOPHIL NFR BLD AUTO: 1.7 % (ref 0.3–6.2)
ERYTHROCYTE [DISTWIDTH] IN BLOOD BY AUTOMATED COUNT: 13.3 % (ref 12.3–15.4)
GLOBULIN UR ELPH-MCNC: 2.6 GM/DL
GLUCOSE SERPL-MCNC: 101 MG/DL (ref 65–99)
GLUCOSE UR STRIP-MCNC: NEGATIVE MG/DL
HCT VFR BLD AUTO: 44.5 % (ref 34–46.6)
HGB BLD-MCNC: 14.5 G/DL (ref 12–15.9)
HGB UR QL STRIP.AUTO: NEGATIVE
IMM GRANULOCYTES # BLD AUTO: 0.02 10*3/MM3 (ref 0–0.05)
IMM GRANULOCYTES NFR BLD AUTO: 0.2 % (ref 0–0.5)
KETONES UR QL STRIP: NEGATIVE
LEUKOCYTE ESTERASE UR QL STRIP.AUTO: NEGATIVE
LIPASE SERPL-CCNC: 39 U/L (ref 13–60)
LYMPHOCYTES # BLD AUTO: 2.43 10*3/MM3 (ref 0.7–3.1)
LYMPHOCYTES NFR BLD AUTO: 26.9 % (ref 19.6–45.3)
MCH RBC QN AUTO: 31.5 PG (ref 26.6–33)
MCHC RBC AUTO-ENTMCNC: 32.6 G/DL (ref 31.5–35.7)
MCV RBC AUTO: 96.5 FL (ref 79–97)
MONOCYTES # BLD AUTO: 0.63 10*3/MM3 (ref 0.1–0.9)
MONOCYTES NFR BLD AUTO: 7 % (ref 5–12)
NEUTROPHILS NFR BLD AUTO: 5.75 10*3/MM3 (ref 1.7–7)
NEUTROPHILS NFR BLD AUTO: 63.8 % (ref 42.7–76)
NITRITE UR QL STRIP: NEGATIVE
NRBC BLD AUTO-RTO: 0 /100 WBC (ref 0–0.2)
PH UR STRIP.AUTO: 6.5 [PH] (ref 5–8)
PLATELET # BLD AUTO: 302 10*3/MM3 (ref 140–450)
PMV BLD AUTO: 9.6 FL (ref 6–12)
POTASSIUM SERPL-SCNC: 4.1 MMOL/L (ref 3.5–5.2)
PROT SERPL-MCNC: 7.4 G/DL (ref 6–8.5)
PROT UR QL STRIP: NEGATIVE
RBC # BLD AUTO: 4.61 10*6/MM3 (ref 3.77–5.28)
SODIUM SERPL-SCNC: 139 MMOL/L (ref 136–145)
SP GR UR STRIP: <=1.005 (ref 1–1.03)
UROBILINOGEN UR QL STRIP: NORMAL
WBC NRBC COR # BLD AUTO: 9.02 10*3/MM3 (ref 3.4–10.8)

## 2025-05-27 PROCEDURE — 81003 URINALYSIS AUTO W/O SCOPE: CPT | Performed by: EMERGENCY MEDICINE

## 2025-05-27 PROCEDURE — 83690 ASSAY OF LIPASE: CPT | Performed by: EMERGENCY MEDICINE

## 2025-05-27 PROCEDURE — 99284 EMERGENCY DEPT VISIT MOD MDM: CPT

## 2025-05-27 PROCEDURE — 74176 CT ABD & PELVIS W/O CONTRAST: CPT

## 2025-05-27 PROCEDURE — 80053 COMPREHEN METABOLIC PANEL: CPT | Performed by: EMERGENCY MEDICINE

## 2025-05-27 PROCEDURE — 25810000003 SODIUM CHLORIDE 0.9 % SOLUTION: Performed by: EMERGENCY MEDICINE

## 2025-05-27 PROCEDURE — 85025 COMPLETE CBC W/AUTO DIFF WBC: CPT | Performed by: EMERGENCY MEDICINE

## 2025-05-27 RX ORDER — SODIUM CHLORIDE 0.9 % (FLUSH) 0.9 %
10 SYRINGE (ML) INJECTION AS NEEDED
Status: DISCONTINUED | OUTPATIENT
Start: 2025-05-27 | End: 2025-05-27 | Stop reason: HOSPADM

## 2025-05-27 RX ADMIN — SODIUM CHLORIDE 1000 ML: 9 INJECTION, SOLUTION INTRAVENOUS at 09:45

## 2025-05-27 NOTE — ED PROVIDER NOTES
EMERGENCY DEPARTMENT ENCOUNTER    Room Number:  27/27  PCP: Provider, No Known    HPI:  Chief Complaint: Left flank pain  A complete HPI/ROS/PMH/PSH/SH/FH are unobtainable due to: None  Context: Shira Cortes is a 65 y.o. female who presents to the ED c/o acute left flank pain.  Onset 6 days ago.  Pain was initially remitted but is now more constant.  It is sharp pain.  It is currently 4/10 in intensity and she declines any pain medication.  She thinks this is due to kidney stones as she has a history of known stones to the left kidney.  She started taking Flomax 2 days ago.        PAST MEDICAL HISTORY  Active Ambulatory Problems     Diagnosis Date Noted    Cardiomyopathy, hypertrophic, primary familial 02/17/2016    Diastolic dysfunction 02/17/2016    MI (mitral incompetence) 02/17/2016    Elevated cholesterol 02/19/2019    Tobacco use 02/12/2019    S/P MVR (mitral valve repair) 10/05/2022    Dyspnea on exertion 11/13/2024    Elevated troponin 11/13/2024    LBBB (left bundle branch block) 11/26/2024     Resolved Ambulatory Problems     Diagnosis Date Noted    Acute bronchitis 07/27/2017    Hypertrophic cardiomyopathy 05/22/2015     Past Medical History:   Diagnosis Date    Abnormal ECG 2004    Basal cell carcinoma 2015    Congenital heart disease 2009    Frequent headaches     Heart murmur 2009    History of kidney stones     Mass of right ovary     Migraines     Mitral valve insufficiency     Mitral valve prolapse 2009    Pelvic pain     PONV (postoperative nausea and vomiting)     PVC (premature ventricular contraction)     Ventricular tachyarrhythmia          PAST SURGICAL HISTORY  Past Surgical History:   Procedure Laterality Date    CARDIAC CATHETERIZATION  2009    pre op    CARDIAC CATHETERIZATION N/A 11/15/2024    Procedure: Coronary angiography;  Surgeon: Sandeep Turcios MD;  Location: [facility] INVASIVE LOCATION;  Service: Cardiovascular;  Laterality: N/A;    CARDIAC CATHETERIZATION N/A  11/15/2024    Procedure: Left Heart Cath;  Surgeon: Sandeep Turcios MD;  Location:  [facility] CATH INVASIVE LOCATION;  Service: Cardiovascular;  Laterality: N/A;    CARDIAC CATHETERIZATION N/A 11/15/2024    Procedure: Right Heart Cath;  Surgeon: Sandeep Turcios MD;  Location:  [facility] CATH INVASIVE LOCATION;  Service: Cardiovascular;  Laterality: N/A;    DIAGNOSTIC LAPAROSCOPY N/A 2018    Procedure: LAPAROSCOPIC RIGHT SALPINGO OOPHORECTOMY BILATERAL SALPINGECTOMY AND LEFT OVARIAN CYSTECTOMY;  Surgeon: Rochelle Pandey MD;  Location:  [facility];  Service: Gynecology    HYSTERECTOMY      INFERIOR OBLIQUE MYECTOMY      MITRAL VALVE REPAIR/REPLACEMENT      SKIN CANCER EXCISION      URETEROSCOPY LASER LITHOTRIPSY WITH STENT INSERTION           FAMILY HISTORY  Family History   Problem Relation Age of Onset    Heart disease Other     Heart failure Other     Atrial fibrillation Other     Arrhythmia Brother         Has congested heart failure    Heart disease Brother     Heart disease Mother         passed in  end stage HCM    Malig Hyperthermia Neg Hx          SOCIAL HISTORY  Social History     Socioeconomic History    Marital status:    Tobacco Use    Smoking status: Former     Current packs/day: 0.00     Average packs/day: 0.5 packs/day for 40.0 years (20.0 ttl pk-yrs)     Types: Cigarettes     Start date: 3/30/1980     Quit date: 2024     Years since quittin.5     Passive exposure: Current    Smokeless tobacco: Never   Vaping Use    Vaping status: Never Used   Substance and Sexual Activity    Alcohol use: Not Currently     Alcohol/week: 4.0 standard drinks of alcohol     Types: 4 Cans of beer per week    Drug use: No     Comment: CAFFEINE USE     Sexual activity: Not Currently     Partners: Male     Birth control/protection: Post-menopausal         ALLERGIES  Codeine, Penicillins, and Sulfa antibiotics        REVIEW OF SYSTEMS  Review of Systems     Included in HPI  All systems  reviewed and negative except for those discussed in HPI.       PHYSICAL EXAM  ED Triage Vitals [05/27/25 0922]   Temp Heart Rate Resp BP SpO2   -- 74 16 -- 98 %      Temp src Heart Rate Source Patient Position BP Location FiO2 (%)   -- -- -- -- --       Physical Exam      GENERAL: no acute distress  HENT: nares patent  EYES: no scleral icterus  CV: regular rhythm, normal rate  RESPIRATORY: normal effort  ABDOMEN: soft, nontender  MUSCULOSKELETAL: no deformity  NEURO: alert, moves all extremities, follows commands  PSYCH:  calm, cooperative  SKIN: warm, dry    Vital signs and nursing notes reviewed.          LAB RESULTS  Recent Results (from the past 24 hours)   Comprehensive Metabolic Panel    Collection Time: 05/27/25  9:42 AM    Specimen: Blood   Result Value Ref Range    Glucose 101 (H) 65 - 99 mg/dL    BUN 15 8 - 23 mg/dL    Creatinine 0.67 0.57 - 1.00 mg/dL    Sodium 139 136 - 145 mmol/L    Potassium 4.1 3.5 - 5.2 mmol/L    Chloride 104 98 - 107 mmol/L    CO2 25.3 22.0 - 29.0 mmol/L    Calcium 9.9 8.6 - 10.5 mg/dL    Total Protein 7.4 6.0 - 8.5 g/dL    Albumin 4.8 3.5 - 5.2 g/dL    ALT (SGPT) 14 1 - 33 U/L    AST (SGOT) 20 1 - 32 U/L    Alkaline Phosphatase 91 39 - 117 U/L    Total Bilirubin 0.4 0.0 - 1.2 mg/dL    Globulin 2.6 gm/dL    A/G Ratio 1.8 g/dL    BUN/Creatinine Ratio 22.4 7.0 - 25.0    Anion Gap 9.7 5.0 - 15.0 mmol/L    eGFR 96.5 >60.0 mL/min/1.73   Lipase    Collection Time: 05/27/25  9:42 AM    Specimen: Blood   Result Value Ref Range    Lipase 39 13 - 60 U/L   CBC Auto Differential    Collection Time: 05/27/25  9:42 AM    Specimen: Blood   Result Value Ref Range    WBC 9.02 3.40 - 10.80 10*3/mm3    RBC 4.61 3.77 - 5.28 10*6/mm3    Hemoglobin 14.5 12.0 - 15.9 g/dL    Hematocrit 44.5 34.0 - 46.6 %    MCV 96.5 79.0 - 97.0 fL    MCH 31.5 26.6 - 33.0 pg    MCHC 32.6 31.5 - 35.7 g/dL    RDW 13.3 12.3 - 15.4 %    RDW-SD 47.3 37.0 - 54.0 fl    MPV 9.6 6.0 - 12.0 fL    Platelets 302 140 - 450 10*3/mm3     Neutrophil % 63.8 42.7 - 76.0 %    Lymphocyte % 26.9 19.6 - 45.3 %    Monocyte % 7.0 5.0 - 12.0 %    Eosinophil % 1.7 0.3 - 6.2 %    Basophil % 0.4 0.0 - 1.5 %    Immature Grans % 0.2 0.0 - 0.5 %    Neutrophils, Absolute 5.75 1.70 - 7.00 10*3/mm3    Lymphocytes, Absolute 2.43 0.70 - 3.10 10*3/mm3    Monocytes, Absolute 0.63 0.10 - 0.90 10*3/mm3    Eosinophils, Absolute 0.15 0.00 - 0.40 10*3/mm3    Basophils, Absolute 0.04 0.00 - 0.20 10*3/mm3    Immature Grans, Absolute 0.02 0.00 - 0.05 10*3/mm3    nRBC 0.0 0.0 - 0.2 /100 WBC   Urinalysis With Culture If Indicated - Urine, Clean Catch    Collection Time: 05/27/25 11:18 AM    Specimen: Urine, Clean Catch   Result Value Ref Range    Color, UA Yellow Yellow, Straw    Appearance, UA Clear Clear    pH, UA 6.5 5.0 - 8.0    Specific Gravity, UA <=1.005 1.005 - 1.030    Glucose, UA Negative Negative    Ketones, UA Negative Negative    Bilirubin, UA Negative Negative    Blood, UA Negative Negative    Protein, UA Negative Negative    Leuk Esterase, UA Negative Negative    Nitrite, UA Negative Negative    Urobilinogen, UA 0.2 E.U./dL 0.2 - 1.0 E.U./dL       Ordered the above labs and reviewed the results.        RADIOLOGY  CT Abdomen Pelvis Without Contrast  Result Date: 5/27/2025  CT ABDOMEN PELVIS WO CONTRAST-  DATE OF EXAM: 5/27/2025 10:47 AM  INDICATION: left flank pain.  COMPARISON: Radiographs 10/22/2024.  TECHNIQUE: Multiple contiguous axial images were acquired through the abdomen and pelvis without the intravenous administration of contrast. Reformatted coronal and sagittal sequences were also reviewed. Radiation dose reduction techniques were utilized, including automated exposure control and exposure modulation based on body size.  FINDINGS: Partially imaged cardiomegaly. Mild multifocal bibasilar subsegmental atelectasis and/or scarring benign calcified granuloma in the right lung base.  The liver, gallbladder, spleen, and pancreas are unremarkable in limited  noncontrast CT appearance. Likely benign low-density nodular thickening of both adrenal glands. Nonobstructing 5 mm stone in the right kidney and nonobstructing 4 mm stone in the left kidney. No obstructing renal or ureteral stone is identified. No hydronephrosis or hydroureter. The urinary bladder is unremarkable. Status post hysterectomy. A 2.7 cm cystic lesion in the left hemipelvis likely represents a physiologic adnexal cyst. The right adnexa is not definitively identified.  Mild diffuse gastric wall thickening is likely accentuated by under distention. Mild colorectal stool. Colonic diverticula, without CT evidence of diverticulitis. No bowel obstruction. The appendix is normal.  No free fluid in the abdomen or pelvis. No free intraperitoneal air. No pathologically enlarged lymph nodes in the abdomen or pelvis. Mild calcified atherosclerotic disease in the abdominal aorta and its distal branches without aneurysm.  Mild to moderate multilevel lumbar spondylosis. Partially imaged left MAYCO with mild likely chronic acetabular protrusio and mild extension of the acetabular component through the medial wall of the left acetabulum. Likely chronic/developmental right hip dysplasia with mild to moderate right hip joint DJD. No acute osseous abnormality or concerning osseous lesion.       1. Bilateral nephrolithiasis with no obstructing renal or ureteral stone. No hydronephrosis or hydroureter. No acute abnormality in the abdomen or pelvis. 2. Nonspecific 2.7 cm cystic lesion in the left hemipelvis, likely an adnexal cyst. Could consider correlation with pelvic ultrasound if clinically indicated.  This report was finalized on 5/27/2025 11:54 AM by Louie Barnett MD on Workstation: [id number]        Ordered the above noted radiological studies. Reviewed by me in PACS.        MEDICATIONS GIVEN IN ER  Medications   sodium chloride 0.9 % flush 10 mL (has no administration in time range)   sodium chloride 0.9 % bolus 1,000 mL  (1,000 mL Intravenous New Bag 5/27/25 0945)         ORDERS PLACED DURING THIS VISIT:  Orders Placed This Encounter   Procedures    CT Abdomen Pelvis Without Contrast    Comprehensive Metabolic Panel    Lipase    Urinalysis With Culture If Indicated - Urine, Clean Catch    CBC Auto Differential    Insert Peripheral IV    CBC & Differential         OUTPATIENT MEDICATION MANAGEMENT:  Current Facility-Administered Medications Ordered in Epic   Medication Dose Route Frequency Provider Last Rate Last Admin    sodium chloride 0.9 % flush 10 mL  10 mL Intravenous PRN Kit Brock II, MD         Current Outpatient Medications Ordered in Epic   Medication Sig Dispense Refill    alendronate (FOSAMAX) 70 MG tablet Take 1 tablet by mouth Every 7 (Seven) Days.      ALPRAZolam (XANAX) 0.25 MG tablet Take 1 tablet by mouth 2 (Two) Times a Day As Needed for Anxiety. 10 tablet 0    aspirin 81 MG tablet Take 1 tablet by mouth Daily.      atorvastatin (LIPITOR) 20 MG tablet TAKE 1 TABLET BY MOUTH EVERYDAY AT BEDTIME 90 tablet 3    metoprolol succinate XL (TOPROL-XL) 25 MG 24 hr tablet Take 0.5 tablets by mouth Every Night. 45 tablet 3    Multiple Vitamins-Minerals (multivitamin and minerals) liquid liquid Take  by mouth Daily.         PROCEDURES  Procedures          MEDICAL DECISION MAKING, PROGRESS, and CONSULTS    Discussion below represents my analysis of pertinent findings related to patient's condition, differential diagnosis, treatment plan and final disposition.              Differential diagnosis:    Differential diagnosis includes but not limited to:  - hepatobiliary pathology such as cholecystitis, cholangitis, and symptomatic cholelithiasis  - Pancreatitis  - Dyspepsia  - Small bowel obstruction  - Appendicitis  - Diverticulitis  - UTI including pyelonephritis  - Ureteral stone  - Zoster  - Colitis, including infectious and ischemic               Independent interpretation of labs, radiology studies, and discussions  with consultants:  ED Course as of 05/27/25 1231   Tue May 27, 2025   1029 Lipase: 39 [TD]   1029 WBC: 9.02 [TD]   1206 CT imaging of the abdomen and pelvis independently interpreted by myself.  I see no ureteral stones. [TD]   1230 Patient reports having some sacral pain.  She was concerned that she has a pilonidal cyst.  I did examine her back and she has no evidence of abscess or cyst formation. [TD]      ED Course User Index  [TD] Kit Brock II, MD           Clinical Scores:                                   DIAGNOSIS  Final diagnoses:   Left flank pain         DISPOSITION  DISCHARGE    FOLLOW-UP  PATIENT CONNECTION - [facility] [id number]  [phone]  Schedule an appointment as soon as possible for a visit in 3 days  with a primary care doctor    [facility] EMERGENCY DEPARTMENT  [address] [facility] [id number] [phone]  Go to   If symptoms worsen    Cole Oconnor MD  [address] [facility] [address]  [facility] [id number] [phone]    Schedule an appointment as soon as possible for a visit in 3 days           Medication List      No changes were made to your prescriptions during this visit.             Latest Documented Vital Signs:  As of 12:31 EDT  BP- 119/83 HR- 74 Temp- 98.5 °F (36.9 °C) (Oral) O2 sat- 98%      --    Please note that portions of this were completed with a voice recognition program.       Note Disclaimer: At [facility], we believe that sharing information builds trust and better relationships. You are receiving this note because you are receiving care at [facility] or recently visited. It is possible you will see health information before a provider has talked with you about it. This kind of information can be easy to misunderstand. To help you fully understand what it means for your health, we urge you to discuss this note with your provider.         Kit Brock II, MD  05/27/25 1231

## (undated) DEVICE — LOU PACE DEFIB: Brand: MEDLINE INDUSTRIES, INC.

## (undated) DEVICE — CONTAINER,SPECIMEN,OR STERILE,4OZ: Brand: MEDLINE

## (undated) DEVICE — GLV SURG TRIUMPH CLASSIC PF LTX 7 STRL

## (undated) DEVICE — ENDOPATH PNEUMONEEDLE INSUFFLATION NEEDLES WITH LUER LOCK CONNECTORS 120MM: Brand: ENDOPATH

## (undated) DEVICE — APPL CHLORAPREP W/TINT 26ML ORNG

## (undated) DEVICE — HARMONIC ACE +7 LAPAROSCOPIC SHEARS ADVANCED HEMOSTASIS 5MM DIAMETER 36CM SHAFT LENGTH  FOR USE WITH GRAY HAND PIECE ONLY: Brand: HARMONIC ACE

## (undated) DEVICE — ENDOPATH XCEL WITH OPTIVIEW TECHNOLOGY BLADELESS TROCARS WITH STABILITY SLEEVES: Brand: ENDOPATH XCEL OPTIVIEW

## (undated) DEVICE — PK LAP GYN TOWER 40

## (undated) DEVICE — GLIDESHEATH BASIC HYDROPHILIC COATED INTRODUCER SHEATH: Brand: GLIDESHEATH

## (undated) DEVICE — SYR LUERLOK 30CC

## (undated) DEVICE — CATH DIAG IMPULSE PIG 5F 100CM

## (undated) DEVICE — ADHS SKIN DERMABOND TOP ADVANCED

## (undated) DEVICE — ADHS LIQ MASTISOL 2/3ML

## (undated) DEVICE — 2, DISPOSABLE SUCTION/IRRIGATOR WITH DISPOSABLE TIP: Brand: STRYKEFLOW

## (undated) DEVICE — SUT VIC 5/0 PS2 18IN J495H

## (undated) DEVICE — ENDOCUT SCISSOR TIP, DISPOSABLE: Brand: RENEW

## (undated) DEVICE — GLV SURG BIOGEL LTX PF 6 1/2

## (undated) DEVICE — KT MANIFLD CARDIAC

## (undated) DEVICE — R2P DESTINATION SLENDER GUIDING SHEATH: Brand: R2P DESTINATION SLENDER

## (undated) DEVICE — DEV SUT GRSPR CLOSUR 15CM 14G

## (undated) DEVICE — PK CATH CARD 40

## (undated) DEVICE — DGW .035 FC J3MM 260CM TEF: Brand: EMERALD

## (undated) DEVICE — 3M™ STERI-STRIP™ ANTIMICROBIAL SKIN CLOSURES 1/2 INCH X 4 INCHES 50/CARTON 4 CARTONS/CASE A1847: Brand: 3M™ STERI-STRIP™

## (undated) DEVICE — CATH DIAG IMPULSE FR5 5F 100CM

## (undated) DEVICE — PAD SANI MAXI W/ADHS SNG WRP 11IN

## (undated) DEVICE — CATH DIAG CARD PERFORMA MPA1 BT 4F 100CM

## (undated) DEVICE — ENDOPOUCH RETRIEVER SPECIMEN RETRIEVAL BAGS: Brand: ENDOPOUCH RETRIEVER

## (undated) DEVICE — UNDYED BRAIDED (POLYGLACTIN 910), SYNTHETIC ABSORBABLE SUTURE: Brand: COATED VICRYL

## (undated) DEVICE — CATH DIAG IMPULSE FL3.5 5F 100CM

## (undated) DEVICE — BALN PRESS WEDGE 5F 110CM

## (undated) DEVICE — 3M™ STERI-STRIP™ REINFORCED ADHESIVE SKIN CLOSURES, R1546, 1/4 IN X 4 IN (6 MM X 100 MM), 10 STRIPS/ENVELOPE: Brand: 3M™ STERI-STRIP™